# Patient Record
Sex: FEMALE | Race: WHITE | Employment: FULL TIME | URBAN - METROPOLITAN AREA
[De-identification: names, ages, dates, MRNs, and addresses within clinical notes are randomized per-mention and may not be internally consistent; named-entity substitution may affect disease eponyms.]

---

## 2017-01-25 ENCOUNTER — OFFICE VISIT (OUTPATIENT)
Dept: ORTHOPEDIC SURGERY | Age: 72
End: 2017-01-25

## 2017-01-25 VITALS
BODY MASS INDEX: 33.29 KG/M2 | HEIGHT: 67 IN | SYSTOLIC BLOOD PRESSURE: 134 MMHG | HEART RATE: 74 BPM | DIASTOLIC BLOOD PRESSURE: 81 MMHG | WEIGHT: 212.08 LBS

## 2017-01-25 DIAGNOSIS — M54.50 PAIN OF LUMBAR SPINE: ICD-10-CM

## 2017-01-25 DIAGNOSIS — M25.551 PAIN OF BOTH HIP JOINTS: ICD-10-CM

## 2017-01-25 DIAGNOSIS — M25.552 PAIN OF BOTH HIP JOINTS: ICD-10-CM

## 2017-01-25 DIAGNOSIS — M47.816 SPONDYLOSIS OF LUMBAR REGION WITHOUT MYELOPATHY OR RADICULOPATHY: Primary | ICD-10-CM

## 2017-01-25 PROCEDURE — 3017F COLORECTAL CA SCREEN DOC REV: CPT | Performed by: PHYSICAL MEDICINE & REHABILITATION

## 2017-01-25 PROCEDURE — 1090F PRES/ABSN URINE INCON ASSESS: CPT | Performed by: PHYSICAL MEDICINE & REHABILITATION

## 2017-01-25 PROCEDURE — G8427 DOCREV CUR MEDS BY ELIG CLIN: HCPCS | Performed by: PHYSICAL MEDICINE & REHABILITATION

## 2017-01-25 PROCEDURE — G8419 CALC BMI OUT NRM PARAM NOF/U: HCPCS | Performed by: PHYSICAL MEDICINE & REHABILITATION

## 2017-01-25 PROCEDURE — G8484 FLU IMMUNIZE NO ADMIN: HCPCS | Performed by: PHYSICAL MEDICINE & REHABILITATION

## 2017-01-25 PROCEDURE — 72100 X-RAY EXAM L-S SPINE 2/3 VWS: CPT | Performed by: PHYSICAL MEDICINE & REHABILITATION

## 2017-01-25 PROCEDURE — 4040F PNEUMOC VAC/ADMIN/RCVD: CPT | Performed by: PHYSICAL MEDICINE & REHABILITATION

## 2017-01-25 PROCEDURE — 99203 OFFICE O/P NEW LOW 30 MIN: CPT | Performed by: PHYSICAL MEDICINE & REHABILITATION

## 2017-01-25 PROCEDURE — 3014F SCREEN MAMMO DOC REV: CPT | Performed by: PHYSICAL MEDICINE & REHABILITATION

## 2017-01-25 PROCEDURE — 1036F TOBACCO NON-USER: CPT | Performed by: PHYSICAL MEDICINE & REHABILITATION

## 2017-02-07 ENCOUNTER — HOSPITAL ENCOUNTER (OUTPATIENT)
Dept: PHYSICAL THERAPY | Age: 72
Discharge: HOME | End: 2017-02-28
Admitting: PHYSICAL MEDICINE & REHABILITATION

## 2017-02-14 ENCOUNTER — HOSPITAL ENCOUNTER (OUTPATIENT)
Dept: PHYSICAL THERAPY | Age: 72
Discharge: HOME | End: 2017-02-14
Admitting: PHYSICAL MEDICINE & REHABILITATION

## 2017-02-28 ENCOUNTER — HOSPITAL ENCOUNTER (OUTPATIENT)
Dept: PHYSICAL THERAPY | Age: 72
Discharge: HOME | End: 2017-02-28
Admitting: PHYSICAL MEDICINE & REHABILITATION

## 2017-03-02 ENCOUNTER — HOSPITAL ENCOUNTER (OUTPATIENT)
Dept: PHYSICAL THERAPY | Age: 72
Discharge: HOME | End: 2017-03-02
Admitting: PHYSICAL MEDICINE & REHABILITATION

## 2017-03-14 ENCOUNTER — HOSPITAL ENCOUNTER (OUTPATIENT)
Dept: PHYSICAL THERAPY | Age: 72
Discharge: HOME | End: 2017-03-14
Admitting: PHYSICAL MEDICINE & REHABILITATION

## 2017-03-23 ENCOUNTER — HOSPITAL ENCOUNTER (OUTPATIENT)
Dept: PHYSICAL THERAPY | Age: 72
Discharge: HOME | End: 2017-03-23
Admitting: PHYSICAL MEDICINE & REHABILITATION

## 2017-05-04 PROBLEM — M75.80 ROTATOR CUFF TENDINITIS: Status: ACTIVE | Noted: 2017-05-04

## 2017-05-04 PROBLEM — M75.81 TENDINITIS OF RIGHT ROTATOR CUFF: Status: ACTIVE | Noted: 2017-05-04

## 2017-05-04 PROBLEM — M25.511 CHRONIC RIGHT SHOULDER PAIN: Status: ACTIVE | Noted: 2017-05-04

## 2017-05-04 PROBLEM — M75.21 BICEPS TENDINITIS ON RIGHT: Status: ACTIVE | Noted: 2017-05-04

## 2017-05-04 PROBLEM — G89.29 CHRONIC RIGHT SHOULDER PAIN: Status: ACTIVE | Noted: 2017-05-04

## 2017-05-04 PROBLEM — M75.101 TEAR OF RIGHT ROTATOR CUFF: Status: ACTIVE | Noted: 2017-05-04

## 2017-05-04 PROBLEM — M75.50 BURSITIS OF SHOULDER: Status: ACTIVE | Noted: 2017-05-04

## 2017-05-04 PROBLEM — M75.51 BURSITIS OF RIGHT SHOULDER: Status: ACTIVE | Noted: 2017-05-04

## 2017-05-04 PROBLEM — M47.812 NECK ARTHRITIS: Status: ACTIVE | Noted: 2017-05-04

## 2017-05-04 PROBLEM — M54.2 NECK PAIN: Status: ACTIVE | Noted: 2017-05-04

## 2017-05-04 PROBLEM — Z98.890 HISTORY OF SHOULDER SURGERY: Status: ACTIVE | Noted: 2017-05-04

## 2017-05-16 ENCOUNTER — HOSPITAL ENCOUNTER (OUTPATIENT)
Dept: MRI IMAGING | Age: 72
Discharge: HOME | End: 2017-05-16
Attending: ORTHOPAEDIC SURGERY | Admitting: ORTHOPAEDIC SURGERY

## 2017-05-16 DIAGNOSIS — M25.511 RIGHT SHOULDER PAIN, UNSPECIFIED CHRONICITY: ICD-10-CM

## 2017-05-16 DIAGNOSIS — M75.101 ROTATOR CUFF SYNDROME OF RIGHT SHOULDER: ICD-10-CM

## 2017-05-16 DIAGNOSIS — M77.8 TENDINITIS OF RIGHT SHOULDER: ICD-10-CM

## 2017-05-16 DIAGNOSIS — M25.511 CHRONIC RIGHT SHOULDER PAIN: ICD-10-CM

## 2017-05-16 DIAGNOSIS — M75.22 BICEPS TENDINITIS OF BOTH SHOULDERS: ICD-10-CM

## 2017-05-16 DIAGNOSIS — M75.101 TEAR OF RIGHT ROTATOR CUFF, UNSPECIFIED TEAR EXTENT: ICD-10-CM

## 2017-05-16 DIAGNOSIS — M75.51 BURSITIS OF RIGHT SHOULDER: ICD-10-CM

## 2017-05-16 DIAGNOSIS — M75.51 ACUTE BURSITIS OF RIGHT SHOULDER: ICD-10-CM

## 2017-05-16 DIAGNOSIS — M75.81 TENDINITIS OF RIGHT ROTATOR CUFF: ICD-10-CM

## 2017-05-16 DIAGNOSIS — G89.29 CHRONIC RIGHT SHOULDER PAIN: ICD-10-CM

## 2017-05-16 DIAGNOSIS — M75.101 RIGHT ROTATOR CUFF TEAR: ICD-10-CM

## 2017-05-16 DIAGNOSIS — M75.21 BICEPS TENDINITIS ON RIGHT: ICD-10-CM

## 2017-05-16 DIAGNOSIS — Z98.890 HISTORY OF SHOULDER SURGERY: ICD-10-CM

## 2017-05-16 DIAGNOSIS — M75.21 BICEPS TENDINITIS OF BOTH SHOULDERS: ICD-10-CM

## 2017-05-16 DIAGNOSIS — G89.29 OTHER CHRONIC PAIN: ICD-10-CM

## 2017-05-16 DIAGNOSIS — M75.21 BICEPS TENDINITIS OF RIGHT SHOULDER: ICD-10-CM

## 2017-05-16 RX ORDER — LIDOCAINE HYDROCHLORIDE 10 MG/ML
5 INJECTION, SOLUTION INFILTRATION; PERINEURAL ONCE
Status: COMPLETED | OUTPATIENT
Start: 2017-05-16 | End: 2017-05-16

## 2017-05-16 RX ORDER — LIDOCAINE HYDROCHLORIDE 10 MG/ML
5 INJECTION, SOLUTION EPIDURAL; INFILTRATION; INTRACAUDAL; PERINEURAL ONCE
Status: COMPLETED | OUTPATIENT
Start: 2017-05-16 | End: 2017-05-16

## 2017-05-16 RX ADMIN — LIDOCAINE HYDROCHLORIDE 5 ML: 10 INJECTION, SOLUTION INFILTRATION; PERINEURAL at 09:14

## 2017-05-16 RX ADMIN — LIDOCAINE HYDROCHLORIDE 5 ML: 10 INJECTION, SOLUTION EPIDURAL; INFILTRATION; INTRACAUDAL; PERINEURAL at 09:13

## 2017-05-16 ASSESSMENT — PAIN SCALES - GENERAL: PAINLEVEL_OUTOF10: 6

## 2017-05-22 ENCOUNTER — OFFICE VISIT (OUTPATIENT)
Dept: ORTHOPEDIC SURGERY | Age: 72
End: 2017-05-22

## 2017-05-22 VITALS
DIASTOLIC BLOOD PRESSURE: 79 MMHG | SYSTOLIC BLOOD PRESSURE: 132 MMHG | HEIGHT: 68 IN | BODY MASS INDEX: 33.34 KG/M2 | WEIGHT: 220 LBS

## 2017-05-22 DIAGNOSIS — M50.30 DEGENERATIVE CERVICAL DISC: ICD-10-CM

## 2017-05-22 DIAGNOSIS — G89.29 CHRONIC SCAPULAR PAIN: ICD-10-CM

## 2017-05-22 DIAGNOSIS — M47.812 SPONDYLOSIS OF CERVICAL REGION WITHOUT MYELOPATHY OR RADICULOPATHY: Primary | ICD-10-CM

## 2017-05-22 DIAGNOSIS — M89.8X1 CHRONIC SCAPULAR PAIN: ICD-10-CM

## 2017-05-22 PROCEDURE — G8427 DOCREV CUR MEDS BY ELIG CLIN: HCPCS | Performed by: PHYSICAL MEDICINE & REHABILITATION

## 2017-05-22 PROCEDURE — G8417 CALC BMI ABV UP PARAM F/U: HCPCS | Performed by: PHYSICAL MEDICINE & REHABILITATION

## 2017-05-22 PROCEDURE — 3017F COLORECTAL CA SCREEN DOC REV: CPT | Performed by: PHYSICAL MEDICINE & REHABILITATION

## 2017-05-22 PROCEDURE — 1036F TOBACCO NON-USER: CPT | Performed by: PHYSICAL MEDICINE & REHABILITATION

## 2017-05-22 PROCEDURE — 3014F SCREEN MAMMO DOC REV: CPT | Performed by: PHYSICAL MEDICINE & REHABILITATION

## 2017-05-22 PROCEDURE — 1123F ACP DISCUSS/DSCN MKR DOCD: CPT | Performed by: PHYSICAL MEDICINE & REHABILITATION

## 2017-05-22 PROCEDURE — 4040F PNEUMOC VAC/ADMIN/RCVD: CPT | Performed by: PHYSICAL MEDICINE & REHABILITATION

## 2017-05-22 PROCEDURE — 1090F PRES/ABSN URINE INCON ASSESS: CPT | Performed by: PHYSICAL MEDICINE & REHABILITATION

## 2017-05-22 PROCEDURE — G8400 PT W/DXA NO RESULTS DOC: HCPCS | Performed by: PHYSICAL MEDICINE & REHABILITATION

## 2017-05-22 PROCEDURE — 99214 OFFICE O/P EST MOD 30 MIN: CPT | Performed by: PHYSICAL MEDICINE & REHABILITATION

## 2017-05-25 ENCOUNTER — TELEPHONE (OUTPATIENT)
Dept: ORTHOPEDIC SURGERY | Age: 72
End: 2017-05-25

## 2017-05-25 ENCOUNTER — OFFICE VISIT (OUTPATIENT)
Dept: ORTHOPEDIC SURGERY | Age: 72
End: 2017-05-25

## 2017-05-25 VITALS
WEIGHT: 220 LBS | SYSTOLIC BLOOD PRESSURE: 154 MMHG | HEART RATE: 79 BPM | DIASTOLIC BLOOD PRESSURE: 92 MMHG | HEIGHT: 68 IN | BODY MASS INDEX: 33.34 KG/M2

## 2017-05-25 DIAGNOSIS — M75.81 TENDINITIS OF RIGHT ROTATOR CUFF: ICD-10-CM

## 2017-05-25 DIAGNOSIS — M22.41 CHONDROMALACIA PATELLAE OF RIGHT KNEE: ICD-10-CM

## 2017-05-25 DIAGNOSIS — M47.812 NECK ARTHRITIS: ICD-10-CM

## 2017-05-25 DIAGNOSIS — Z98.890 S/P RIGHT KNEE ARTHROSCOPY: ICD-10-CM

## 2017-05-25 DIAGNOSIS — Z98.890 HISTORY OF SHOULDER SURGERY: ICD-10-CM

## 2017-05-25 DIAGNOSIS — G89.29 CHRONIC PAIN OF LEFT KNEE: ICD-10-CM

## 2017-05-25 DIAGNOSIS — M17.11 PRIMARY OSTEOARTHRITIS OF RIGHT KNEE: ICD-10-CM

## 2017-05-25 DIAGNOSIS — G89.29 CHRONIC PAIN OF RIGHT KNEE: ICD-10-CM

## 2017-05-25 DIAGNOSIS — M22.42 CHONDROMALACIA PATELLAE OF LEFT KNEE: ICD-10-CM

## 2017-05-25 DIAGNOSIS — M17.12 PRIMARY OSTEOARTHRITIS OF LEFT KNEE: ICD-10-CM

## 2017-05-25 DIAGNOSIS — Z98.890 S/P LEFT KNEE ARTHROSCOPY: ICD-10-CM

## 2017-05-25 DIAGNOSIS — M54.2 NECK PAIN: ICD-10-CM

## 2017-05-25 DIAGNOSIS — M75.121 COMPLETE TEAR OF RIGHT ROTATOR CUFF: ICD-10-CM

## 2017-05-25 DIAGNOSIS — M25.511 CHRONIC RIGHT SHOULDER PAIN: Primary | ICD-10-CM

## 2017-05-25 DIAGNOSIS — M75.51 BURSITIS OF RIGHT SHOULDER: ICD-10-CM

## 2017-05-25 DIAGNOSIS — G89.29 CHRONIC RIGHT SHOULDER PAIN: Primary | ICD-10-CM

## 2017-05-25 DIAGNOSIS — M25.561 CHRONIC PAIN OF RIGHT KNEE: ICD-10-CM

## 2017-05-25 DIAGNOSIS — M25.562 CHRONIC PAIN OF LEFT KNEE: ICD-10-CM

## 2017-05-25 DIAGNOSIS — M75.21 BICEPS TENDINITIS ON RIGHT: ICD-10-CM

## 2017-05-25 PROCEDURE — 1036F TOBACCO NON-USER: CPT | Performed by: ORTHOPAEDIC SURGERY

## 2017-05-25 PROCEDURE — 20610 DRAIN/INJ JOINT/BURSA W/O US: CPT | Performed by: ORTHOPAEDIC SURGERY

## 2017-05-25 PROCEDURE — 99214 OFFICE O/P EST MOD 30 MIN: CPT | Performed by: ORTHOPAEDIC SURGERY

## 2017-05-25 PROCEDURE — G8400 PT W/DXA NO RESULTS DOC: HCPCS | Performed by: ORTHOPAEDIC SURGERY

## 2017-05-25 PROCEDURE — G8427 DOCREV CUR MEDS BY ELIG CLIN: HCPCS | Performed by: ORTHOPAEDIC SURGERY

## 2017-05-25 PROCEDURE — 1123F ACP DISCUSS/DSCN MKR DOCD: CPT | Performed by: ORTHOPAEDIC SURGERY

## 2017-05-25 PROCEDURE — G8417 CALC BMI ABV UP PARAM F/U: HCPCS | Performed by: ORTHOPAEDIC SURGERY

## 2017-05-25 PROCEDURE — 3017F COLORECTAL CA SCREEN DOC REV: CPT | Performed by: ORTHOPAEDIC SURGERY

## 2017-05-25 PROCEDURE — 1090F PRES/ABSN URINE INCON ASSESS: CPT | Performed by: ORTHOPAEDIC SURGERY

## 2017-05-25 PROCEDURE — 3014F SCREEN MAMMO DOC REV: CPT | Performed by: ORTHOPAEDIC SURGERY

## 2017-05-25 PROCEDURE — 4040F PNEUMOC VAC/ADMIN/RCVD: CPT | Performed by: ORTHOPAEDIC SURGERY

## 2017-06-20 ENCOUNTER — HOSPITAL ENCOUNTER (OUTPATIENT)
Dept: MRI IMAGING | Age: 72
Discharge: HOME | End: 2017-06-20
Admitting: PHYSICAL MEDICINE & REHABILITATION

## 2017-06-20 DIAGNOSIS — M89.8X1 CHRONIC SCAPULAR PAIN: ICD-10-CM

## 2017-06-20 DIAGNOSIS — M47.812 SPONDYLOSIS OF CERVICAL REGION WITHOUT MYELOPATHY OR RADICULOPATHY: ICD-10-CM

## 2017-06-20 DIAGNOSIS — M50.30 DEGENERATIVE CERVICAL DISC: ICD-10-CM

## 2017-06-20 DIAGNOSIS — G89.29 CHRONIC SCAPULAR PAIN: ICD-10-CM

## 2017-06-23 ENCOUNTER — TELEPHONE (OUTPATIENT)
Dept: ORTHOPEDIC SURGERY | Age: 72
End: 2017-06-23

## 2017-06-23 ENCOUNTER — OFFICE VISIT (OUTPATIENT)
Dept: ORTHOPEDIC SURGERY | Age: 72
End: 2017-06-23

## 2017-06-23 VITALS
WEIGHT: 220 LBS | HEIGHT: 68 IN | BODY MASS INDEX: 33.34 KG/M2 | DIASTOLIC BLOOD PRESSURE: 77 MMHG | SYSTOLIC BLOOD PRESSURE: 126 MMHG

## 2017-06-23 DIAGNOSIS — M47.812 SPONDYLOSIS OF CERVICAL REGION WITHOUT MYELOPATHY OR RADICULOPATHY: ICD-10-CM

## 2017-06-23 DIAGNOSIS — M48.02 CERVICAL STENOSIS OF SPINE: Primary | ICD-10-CM

## 2017-06-23 DIAGNOSIS — M50.20 HNP (HERNIATED NUCLEUS PULPOSUS), CERVICAL: ICD-10-CM

## 2017-06-23 PROCEDURE — 4040F PNEUMOC VAC/ADMIN/RCVD: CPT | Performed by: PHYSICAL MEDICINE & REHABILITATION

## 2017-06-23 PROCEDURE — G8400 PT W/DXA NO RESULTS DOC: HCPCS | Performed by: PHYSICAL MEDICINE & REHABILITATION

## 2017-06-23 PROCEDURE — 1123F ACP DISCUSS/DSCN MKR DOCD: CPT | Performed by: PHYSICAL MEDICINE & REHABILITATION

## 2017-06-23 PROCEDURE — 1090F PRES/ABSN URINE INCON ASSESS: CPT | Performed by: PHYSICAL MEDICINE & REHABILITATION

## 2017-06-23 PROCEDURE — G8427 DOCREV CUR MEDS BY ELIG CLIN: HCPCS | Performed by: PHYSICAL MEDICINE & REHABILITATION

## 2017-06-23 PROCEDURE — 99212 OFFICE O/P EST SF 10 MIN: CPT | Performed by: PHYSICAL MEDICINE & REHABILITATION

## 2017-06-23 PROCEDURE — 3017F COLORECTAL CA SCREEN DOC REV: CPT | Performed by: PHYSICAL MEDICINE & REHABILITATION

## 2017-06-23 PROCEDURE — 1036F TOBACCO NON-USER: CPT | Performed by: PHYSICAL MEDICINE & REHABILITATION

## 2017-06-23 PROCEDURE — G8417 CALC BMI ABV UP PARAM F/U: HCPCS | Performed by: PHYSICAL MEDICINE & REHABILITATION

## 2017-06-23 PROCEDURE — 3014F SCREEN MAMMO DOC REV: CPT | Performed by: PHYSICAL MEDICINE & REHABILITATION

## 2017-07-20 ENCOUNTER — HOSPITAL ENCOUNTER (OUTPATIENT)
Dept: PREADMISSION TESTING | Age: 72
Discharge: HOME | End: 2017-07-20
Admitting: ORTHOPAEDIC SURGERY

## 2017-07-20 VITALS — BODY MASS INDEX: 33.34 KG/M2 | HEIGHT: 68 IN | WEIGHT: 220 LBS

## 2017-07-20 RX ORDER — CHLORHEXIDINE GLUCONATE 0.12 MG/ML
15 RINSE ORAL 2 TIMES DAILY
Status: CANCELLED | OUTPATIENT
Start: 2017-07-23

## 2017-07-24 ENCOUNTER — HOSPITAL ENCOUNTER (OUTPATIENT)
Dept: SURGERY | Age: 72
Discharge: HOME | End: 2017-07-24
Admitting: ORTHOPAEDIC SURGERY

## 2017-07-24 VITALS
SYSTOLIC BLOOD PRESSURE: 144 MMHG | TEMPERATURE: 98 F | BODY MASS INDEX: 34.53 KG/M2 | OXYGEN SATURATION: 95 % | WEIGHT: 220 LBS | HEIGHT: 67 IN | HEART RATE: 56 BPM | DIASTOLIC BLOOD PRESSURE: 81 MMHG | RESPIRATION RATE: 16 BRPM

## 2017-07-24 DIAGNOSIS — G89.18 POST-OP PAIN: Primary | ICD-10-CM

## 2017-07-24 RX ORDER — MORPHINE SULFATE 2 MG/ML
2 INJECTION, SOLUTION INTRAMUSCULAR; INTRAVENOUS EVERY 5 MIN PRN
Status: DISCONTINUED | OUTPATIENT
Start: 2017-07-24 | End: 2017-07-25 | Stop reason: HOSPADM

## 2017-07-24 RX ORDER — LIDOCAINE HYDROCHLORIDE 10 MG/ML
1 INJECTION, SOLUTION EPIDURAL; INFILTRATION; INTRACAUDAL; PERINEURAL
Status: ACTIVE | OUTPATIENT
Start: 2017-07-24 | End: 2017-07-24

## 2017-07-24 RX ORDER — SODIUM CHLORIDE 0.9 % (FLUSH) 0.9 %
10 SYRINGE (ML) INJECTION EVERY 12 HOURS SCHEDULED
Status: DISCONTINUED | OUTPATIENT
Start: 2017-07-24 | End: 2017-07-25 | Stop reason: HOSPADM

## 2017-07-24 RX ORDER — HYDRALAZINE HYDROCHLORIDE 20 MG/ML
5 INJECTION INTRAMUSCULAR; INTRAVENOUS EVERY 5 MIN PRN
Status: DISCONTINUED | OUTPATIENT
Start: 2017-07-24 | End: 2017-07-25 | Stop reason: HOSPADM

## 2017-07-24 RX ORDER — SODIUM CHLORIDE 0.9 % (FLUSH) 0.9 %
10 SYRINGE (ML) INJECTION PRN
Status: DISCONTINUED | OUTPATIENT
Start: 2017-07-24 | End: 2017-07-25 | Stop reason: HOSPADM

## 2017-07-24 RX ORDER — ONDANSETRON 2 MG/ML
4 INJECTION INTRAMUSCULAR; INTRAVENOUS ONCE
Status: DISCONTINUED | OUTPATIENT
Start: 2017-07-24 | End: 2017-07-25 | Stop reason: HOSPADM

## 2017-07-24 RX ORDER — BUPIVACAINE HYDROCHLORIDE AND EPINEPHRINE 2.5; 5 MG/ML; UG/ML
INJECTION, SOLUTION EPIDURAL; INFILTRATION; INTRACAUDAL; PERINEURAL
Status: DISCONTINUED
Start: 2017-07-24 | End: 2017-07-24 | Stop reason: WASHOUT

## 2017-07-24 RX ORDER — FENTANYL CITRATE 50 UG/ML
25 INJECTION, SOLUTION INTRAMUSCULAR; INTRAVENOUS EVERY 5 MIN PRN
Status: DISCONTINUED | OUTPATIENT
Start: 2017-07-24 | End: 2017-07-25 | Stop reason: HOSPADM

## 2017-07-24 RX ORDER — SODIUM CHLORIDE, SODIUM LACTATE, POTASSIUM CHLORIDE, CALCIUM CHLORIDE 600; 310; 30; 20 MG/100ML; MG/100ML; MG/100ML; MG/100ML
INJECTION, SOLUTION INTRAVENOUS CONTINUOUS
Status: DISCONTINUED | OUTPATIENT
Start: 2017-07-24 | End: 2017-07-25 | Stop reason: HOSPADM

## 2017-07-24 RX ORDER — LABETALOL HYDROCHLORIDE 5 MG/ML
5 INJECTION, SOLUTION INTRAVENOUS EVERY 10 MIN PRN
Status: DISCONTINUED | OUTPATIENT
Start: 2017-07-24 | End: 2017-07-25 | Stop reason: HOSPADM

## 2017-07-24 RX ORDER — CHLORHEXIDINE GLUCONATE 0.12 MG/ML
15 RINSE ORAL 2 TIMES DAILY
Status: DISCONTINUED | OUTPATIENT
Start: 2017-07-24 | End: 2017-07-25 | Stop reason: HOSPADM

## 2017-07-24 RX ORDER — ENALAPRILAT 2.5 MG/2ML
1.25 INJECTION INTRAVENOUS
Status: ACTIVE | OUTPATIENT
Start: 2017-07-24 | End: 2017-07-24

## 2017-07-24 RX ORDER — HYDROCODONE BITARTRATE AND ACETAMINOPHEN 5; 325 MG/1; MG/1
1 TABLET ORAL EVERY 6 HOURS PRN
Qty: 30 TABLET | Refills: 0 | Status: SHIPPED | OUTPATIENT
Start: 2017-07-24 | End: 2017-08-03

## 2017-07-24 RX ORDER — MIDAZOLAM HYDROCHLORIDE 1 MG/ML
2 INJECTION INTRAMUSCULAR; INTRAVENOUS
Status: DISPENSED | OUTPATIENT
Start: 2017-07-24 | End: 2017-07-24

## 2017-07-24 RX ORDER — FENTANYL CITRATE 50 UG/ML
100 INJECTION, SOLUTION INTRAMUSCULAR; INTRAVENOUS ONCE
Status: COMPLETED | OUTPATIENT
Start: 2017-07-24 | End: 2017-07-24

## 2017-07-24 RX ORDER — PROMETHAZINE HYDROCHLORIDE 25 MG/1
25 TABLET ORAL EVERY 8 HOURS PRN
Qty: 30 TABLET | Refills: 0 | Status: SHIPPED | OUTPATIENT
Start: 2017-07-24 | End: 2017-07-31

## 2017-07-24 RX ORDER — DEXAMETHASONE SODIUM PHOSPHATE 4 MG/ML
4 INJECTION, SOLUTION INTRA-ARTICULAR; INTRALESIONAL; INTRAMUSCULAR; INTRAVENOUS; SOFT TISSUE ONCE
Status: COMPLETED | OUTPATIENT
Start: 2017-07-24 | End: 2017-07-24

## 2017-07-24 RX ORDER — ONDANSETRON 2 MG/ML
4 INJECTION INTRAMUSCULAR; INTRAVENOUS
Status: COMPLETED | OUTPATIENT
Start: 2017-07-24 | End: 2017-07-24

## 2017-07-24 RX ORDER — GLYCOPYRROLATE 0.2 MG/ML
0.2 INJECTION INTRAMUSCULAR; INTRAVENOUS ONCE
Status: COMPLETED | OUTPATIENT
Start: 2017-07-24 | End: 2017-07-24

## 2017-07-24 RX ORDER — BUPIVACAINE HYDROCHLORIDE 5 MG/ML
INJECTION, SOLUTION EPIDURAL; INTRACAUDAL
Status: DISPENSED
Start: 2017-07-24 | End: 2017-07-24

## 2017-07-24 RX ADMIN — GLYCOPYRROLATE 0.2 MG: 0.2 INJECTION INTRAMUSCULAR; INTRAVENOUS at 07:10

## 2017-07-24 RX ADMIN — DEXAMETHASONE SODIUM PHOSPHATE 4 MG: 4 INJECTION, SOLUTION INTRA-ARTICULAR; INTRALESIONAL; INTRAMUSCULAR; INTRAVENOUS; SOFT TISSUE at 06:41

## 2017-07-24 RX ADMIN — ONDANSETRON 4 MG: 2 INJECTION INTRAMUSCULAR; INTRAVENOUS at 07:09

## 2017-07-24 RX ADMIN — SODIUM CHLORIDE, SODIUM LACTATE, POTASSIUM CHLORIDE, CALCIUM CHLORIDE: 600; 310; 30; 20 INJECTION, SOLUTION INTRAVENOUS at 06:08

## 2017-07-24 RX ADMIN — FENTANYL CITRATE 50 MCG: 50 INJECTION, SOLUTION INTRAMUSCULAR; INTRAVENOUS at 07:10

## 2017-07-24 ASSESSMENT — PAIN SCALES - GENERAL
PAINLEVEL_OUTOF10: 0

## 2017-07-24 ASSESSMENT — PAIN DESCRIPTION - PAIN TYPE: TYPE: SURGICAL PAIN

## 2017-07-24 ASSESSMENT — PAIN - FUNCTIONAL ASSESSMENT: PAIN_FUNCTIONAL_ASSESSMENT: 0-10

## 2017-07-24 ASSESSMENT — PAIN DESCRIPTION - LOCATION: LOCATION: SHOULDER

## 2017-07-24 ASSESSMENT — PAIN DESCRIPTION - ORIENTATION: ORIENTATION: RIGHT

## 2017-08-01 DIAGNOSIS — Z98.890 S/P ROTATOR CUFF REPAIR: ICD-10-CM

## 2017-08-03 PROBLEM — M75.21 BICEPS TENDINITIS ON RIGHT: Status: RESOLVED | Noted: 2017-05-04 | Resolved: 2017-08-03

## 2017-08-03 PROBLEM — M75.51 BURSITIS OF RIGHT SHOULDER: Status: RESOLVED | Noted: 2017-05-04 | Resolved: 2017-08-03

## 2017-08-03 PROBLEM — M75.101 TEAR OF RIGHT ROTATOR CUFF: Status: RESOLVED | Noted: 2017-05-04 | Resolved: 2017-08-03

## 2017-08-03 PROBLEM — M75.81 TENDINITIS OF RIGHT ROTATOR CUFF: Status: RESOLVED | Noted: 2017-05-04 | Resolved: 2017-08-03

## 2017-08-15 ENCOUNTER — HOSPITAL ENCOUNTER (OUTPATIENT)
Dept: SURGERY | Age: 72
Discharge: HOME | End: 2017-06-20

## 2017-08-22 ENCOUNTER — HOSPITAL ENCOUNTER (OUTPATIENT)
Dept: PHYSICAL THERAPY | Age: 72
Discharge: HOME | End: 2017-08-31
Admitting: ORTHOPAEDIC SURGERY

## 2017-08-24 ENCOUNTER — HOSPITAL ENCOUNTER (OUTPATIENT)
Dept: PHYSICAL THERAPY | Age: 72
Discharge: HOME | End: 2017-08-24
Admitting: ORTHOPAEDIC SURGERY

## 2017-08-30 ENCOUNTER — HOSPITAL ENCOUNTER (OUTPATIENT)
Dept: PHYSICAL THERAPY | Age: 72
Discharge: HOME | End: 2017-08-30
Admitting: ORTHOPAEDIC SURGERY

## 2017-09-05 ENCOUNTER — HOSPITAL ENCOUNTER (OUTPATIENT)
Dept: PHYSICAL THERAPY | Age: 72
Discharge: HOME | End: 2017-09-05
Admitting: ORTHOPAEDIC SURGERY

## 2017-09-12 ENCOUNTER — HOSPITAL ENCOUNTER (OUTPATIENT)
Dept: PHYSICAL THERAPY | Age: 72
Discharge: HOME | End: 2017-09-12
Admitting: ORTHOPAEDIC SURGERY

## 2017-09-15 ENCOUNTER — HOSPITAL ENCOUNTER (OUTPATIENT)
Dept: PHYSICAL THERAPY | Age: 72
Discharge: HOME | End: 2017-09-15
Admitting: ORTHOPAEDIC SURGERY

## 2017-09-26 ENCOUNTER — HOSPITAL ENCOUNTER (OUTPATIENT)
Dept: PHYSICAL THERAPY | Age: 72
Discharge: HOME | End: 2017-09-26
Admitting: ORTHOPAEDIC SURGERY

## 2017-09-29 ENCOUNTER — HOSPITAL ENCOUNTER (OUTPATIENT)
Dept: PHYSICAL THERAPY | Age: 72
Discharge: HOME | End: 2017-09-29
Admitting: ORTHOPAEDIC SURGERY

## 2017-09-29 NOTE — FLOWSHEET NOTE
[facility], [place]    Physical Therapy Daily Treatment Note  Date:  2017    Patient Name:  Olivia Colmenares    :  1945  MRN: [id number]  Medical/Treatment Diagnosis Information:  · Diagnosis: M75.121 (ICD-10-CM) - Complete tear of right rotator cuff; R rotator cuff revision, biceps tenotomy dos: 17  · Treatment Diagnosis: M25.611 R shoulder stiffness; M62.519 shoulder weakness  Insurance/Certification information:  PT Insurance Information: Medicare  Physician Information:  Referring Practitioner: Dr. Kaushik Burnette of care signed (Y/N): Y    Date of Patient follow up with Physician:     G-Code (if applicable):      Date G-Code Applied:         Progress Note: []  Yes  [x]  No  Next due by: Visit #10      Latex Allergy:  [x]NO      []YES  Preferred Language for Healthcare:   [x]English       []other:    Visit # Insurance Allowable   8 Medicare Guidelines     Pain level:  1-2/10     SUBJECTIVE:  Pt. Notes that she has a little more soreness in her shoulder and forearm this week from not wearing the sling anymore. Pt. Denies sharp pain but notes fatigue and soreness. Pt. Reports compliance with HEP.      OBJECTIVE: 9/15  Observation:   Test measurements:      PROM AROM - NT     L R L R   Shoulder Flexion    ~135       Shoulder Abduction            Shoulder External Rotation            Shoulder Internal Rotation                   RESTRICTIONS/PRECAUTIONS: R rotator cuff revision    Exercises/Interventions:   Therapeutic Exercise  Resistance / level Sets/sec Reps Notes   gripping  1 30    Table slides - flexion, abduction  10\" 10 ^   pulleys npv      ER ranger neutral 10\" 10 Start    IR towel npv             Scapular retraction blue 3 10    shrugs 2# 3 10           triceps blue 3 10 ^   biceps 2# 3 10 ^          IR band red 3 10 ^9/26   ER band red 3 10 ^9/26          SL ER npv                           Neuromuscular Re-ed / Therapeutic Activities                                                        Manual Intervention       Shld /GH Mobs       Post Cap mobs       Thoracic/Rib manipualtion       CT MT/Mobs       PROM MT Scap, flexion  rotation 10'              Patient Education:      Therapeutic Exercise and NMR EXR  [x] (21769) Provided verbal/tactile cueing for activities related to strengthening, flexibility, endurance, ROM  for improvements in scapular, scapulothoracic and UE control with self care, reaching, carrying, lifting, house/yardwork, driving/computer work.    [] (69337) Provided verbal/tactile cueing for activities related to improving balance, coordination, kinesthetic sense, posture, motor skill, proprioception  to assist with  scapular, scapulothoracic and UE control with self care, reaching, carrying, lifting, house/yardwork, driving/computer work. Therapeutic Activities:    [] (54656 or 32288) Provided verbal/tactile cueing for activities related to improving balance, coordination, kinesthetic sense, posture, motor skill, proprioception and motor activation to allow for proper function of scapular, scapulothoracic and UE control with self care, carrying, lifting, driving/computer work.      Home Exercise Program:    [x] (15736) Reviewed/Progressed HEP activities related to strengthening, flexibility, endurance, ROM of scapular, scapulothoracic and UE control with self care, reaching, carrying, lifting, house/yardwork, driving/computer work  [] (33883) Reviewed/Progressed HEP activities related to improving balance, coordination, kinesthetic sense, posture, motor skill, proprioception of scapular, scapulothoracic and UE control with self care, reaching, carrying, lifting, house/yardwork, driving/computer work      Manual Treatments:  PROM / STM / Oscillations-Mobs:  G-I, II, III, IV (PA's, Inf., Post.)  [x] (64446) Provided manual therapy to mobilize soft tissue/joints of cervical/CT, scapular GHJ and UE for the purpose of modulating pain, promoting relaxation,  increasing ROM, reducing/eliminating soft tissue swelling/inflammation/restriction, improving soft tissue extensibility and allowing for proper ROM for normal function with self care, reaching, carrying, lifting, house/yardwork, driving/computer work    Modalities:    Ice - 15'      Charges:  Timed Code Treatment Minutes: 38   Total Treatment Minutes: 58       [] EVAL - LOW (88995)   [] EVAL - MOD (42537)  [] EVAL - HIGH (15162)  [] RE-EVAL (13133)  [x] YL(90903) x  2   [] IONTO  [] NMR (31208) x      [] VASO  [x] Manual (20508) x  1    [] Other: group  [] TA x       [] Mech Traction (18395)  [] ES(attended) (29356)      [] ES (un) (74732):     GOALS:  Patient stated goal: regain full range of motion in R shoulder     Therapist goals for Patient:   Short Term Goals: To be achieved in: 2 weeks  1. Independent in HEP and progression per patient tolerance, in order to prevent re-injury. MET   2. Patient will have a decrease in pain to facilitate improvement in movement, function, and ADLs as indicated by Functional Deficits. MET  3. Patient demonstrates understanding of and compliance with precautions following surgery. MET        Long Term Goals: To be achieved in: 4 months  1. Disability index score of 20% or less for the UEFI to assist with reaching prior level of function. 2. Patient will demonstrate increased AROM to 150 shoulder elevation to allow for proper joint functioning as indicated by patients Functional Deficits. 3. Patient will demonstrate an increase in Strength to tolerate submax strength testing and at least 4/5 to allow for proper functional mobility as indicated by patients Functional Deficits. 4. Patient will return to functional activities including dressing, bathing and sleeping without increased symptoms or restriction. 5. Patient will return to work and [facility] [employer] without increased symptoms or restriction. Progression Towards Functional goals:  [x] Patient is progressing as expected towards functional goals listed. [] Progression is slowed due to complexities listed. [] Progression has been slowed due to co-morbidities. [] Plan just implemented, too soon to assess goals progression  [] Other:     Persisting Functional Limitations/Impairments:  []Sitting []Standing   []Walking []Squatting/bending    []Stairs [x]ADL's    []Transfers [x]Reaching  [x]Housework [x]Job related tasks  [x]Driving [x]Sports/Recreation   []Other:    ASSESSMENT:    Treatment/Activity Tolerance:  [x] Patient tolerated treatment well [] Patient limited by fatique  [] Patient limited by pain  [] Patient limited by other medical complications  [x] Other:  Pt. Tolerated therapy today without complaints. Limited in progression due to lingering soreness in shoulder. Pt. Required cueing for technique with band resisted exercises and given updated illustrations of HEP. Pt. With deficits in shoulder motion with manual stretching today due to muscle guarding. Increased resistance with triceps band without issue. Pt. Requires continued progression of post-op protocol in order to return to full functional use of R UE.      Prognosis: [x] Good [] Fair  [] Poor    Patient Requires Follow-up: [x] Yes  [] No    Return to Play:    [x]  N/A     []  Stage 1: Intro to Strength   []  Stage 2: Dynamic Strength and Intro to Plyometrics   []  Stage 3: Advanced Plyometrics and Intro to Throwing   []  Stage 4: Sport specific Training/Return to Sport     []  Ready to Return to Play, [employer]   []  Not Ready for Return to Sports   Comments:      PLAN: 1-2x/wk  [x] Continue per plan of care [] Alter current plan (see comments)  [] Plan of care initiated [] Hold pending MD visit [] Discharge    Electronically signed by: Michael Castillo PT, DPT

## 2017-10-03 ENCOUNTER — HOSPITAL ENCOUNTER (OUTPATIENT)
Dept: PHYSICAL THERAPY | Age: 72
Discharge: HOME | End: 2017-10-03
Admitting: ORTHOPAEDIC SURGERY

## 2017-10-10 ENCOUNTER — HOSPITAL ENCOUNTER (OUTPATIENT)
Dept: PHYSICAL THERAPY | Age: 72
Discharge: HOME | End: 2017-10-10
Admitting: ORTHOPAEDIC SURGERY

## 2017-10-13 ENCOUNTER — HOSPITAL ENCOUNTER (OUTPATIENT)
Dept: PHYSICAL THERAPY | Age: 72
Discharge: HOME | End: 2017-10-13
Admitting: ORTHOPAEDIC SURGERY

## 2017-10-13 NOTE — FLOWSHEET NOTE
[facility], [place]      Physical Therapy Daily Treatment Note  Date:  10/13/2017    Patient Name:  Lawyer Ariza    :  1945  MRN: [id number]  Medical/Treatment Diagnosis Information:  · Diagnosis: M75.121 (ICD-10-CM) - Complete tear of right rotator cuff; R rotator cuff revision, biceps tenotomy dos: 17  · Treatment Diagnosis: M25.611 R shoulder stiffness; M62.519 shoulder weakness  Insurance/Certification information:  PT Insurance Information: Medicare  Physician Information:  Referring Practitioner: Dr. Liz Alves of care signed (Y/N): Y    Date of Patient follow up with Physician:     G-Code (if applicable):      Date G-Code Applied:         Progress Note: []  Yes  [x]  No  Next due by: Visit #20    Latex Allergy:  [x]NO      []YES  Preferred Language for Healthcare:   [x]English       []other:    Visit # Insurance Allowable   12 Medicare Guidelines     Pain level:  1-2/10     SUBJECTIVE: Pt. Reports that her shoulder is a little sore today but not worse than previously. Pt. States that she was fatigued following last therapy session. Pt. Reports compliance with HEP.        OBJECTIVE: 10/10  Observation:    Incisions: closed and healing  Test measurements:      PROM AROM - NT     L R L R   Shoulder Flexion    156       Shoulder Abduction    148        Shoulder External Rotation     60       Shoulder Internal Rotation     -25              RESTRICTIONS/PRECAUTIONS: R rotator cuff revision    Exercises/Interventions:   Therapeutic Exercise  Resistance / level Sets/sec Reps Notes      Table slides - flexion, abduction  10\" 10 ^8/30   pulleys scaption 10\" 10 Start 10/3   ER ranger 70 degrees 10\" 10 ^10/3   IR towel  10\" 10 Start 10/3   Wall walks  10\" 10 Start 10/10          Scapular retraction black 3 10 ^10/10   shrugs 3# 3 10 ^10/10          triceps black 3 10 ^10/3   biceps 3# 3 10 ^10/10          IR band green 3 10 ^10/3   ER band green 3 (35325) Provided manual therapy to mobilize soft tissue/joints of cervical/CT, scapular GHJ and UE for the purpose of modulating pain, promoting relaxation,  increasing ROM, reducing/eliminating soft tissue swelling/inflammation/restriction, improving soft tissue extensibility and allowing for proper ROM for normal function with self care, reaching, carrying, lifting, house/yardwork, driving/computer work    Modalities:    Ice - 15'      Charges:  Timed Code Treatment Minutes: 45   Total Treatment Minutes: 65       [] EVAL - LOW (97580)   [] EVAL - MOD (59547)  [] EVAL - HIGH (23379)  [] RE-EVAL (43511)  [x] SK(51209) x  2   [] IONTO  [] NMR (74933) x      [] VASO  [x] Manual (90552) x  1    [] Other:   [] TA x       [] Mech Traction (98913)  [] ES(attended) (46192)      [] ES (un) (23204):     GOALS:  Patient stated goal: regain full range of motion in R shoulder     Therapist goals for Patient:   Short Term Goals: To be achieved in: 2 weeks  1. Independent in HEP and progression per patient tolerance, in order to prevent re-injury. MET   2. Patient will have a decrease in pain to facilitate improvement in movement, function, and ADLs as indicated by Functional Deficits. MET  3. Patient demonstrates understanding of and compliance with precautions following surgery. MET        Long Term Goals: To be achieved in: 4 months  1. Disability index score of 20% or less for the UEFI to assist with reaching prior level of function. PROGRESSING  2. Patient will demonstrate increased AROM to 150 shoulder elevation to allow for proper joint functioning as indicated by patients Functional Deficits. PROM PROGRESSING  3. Patient will demonstrate an increase in Strength to tolerate submax strength testing and at least 4/5 to allow for proper functional mobility as indicated by patients Functional Deficits. NT  4.  Patient will return to functional activities including dressing, bathing and sleeping without increased symptoms or

## 2017-10-17 ENCOUNTER — HOSPITAL ENCOUNTER (OUTPATIENT)
Dept: PHYSICAL THERAPY | Age: 72
Discharge: HOME | End: 2017-10-17
Admitting: ORTHOPAEDIC SURGERY

## 2017-10-17 NOTE — FLOWSHEET NOTE
[facility], [place]      Physical Therapy Daily Treatment Note  Date:  10/17/2017    Patient Name:  Alyce Graft    :  1945  MRN: [id number]  Medical/Treatment Diagnosis Information:  · Diagnosis: M75.121 (ICD-10-CM) - Complete tear of right rotator cuff; R rotator cuff revision, biceps tenotomy dos: 17  · Treatment Diagnosis: M25.611 R shoulder stiffness; M62.519 shoulder weakness  Insurance/Certification information:  PT Insurance Information: Medicare  Physician Information:  Referring Practitioner: Dr. Keenan Wall of care signed (Y/N): Y    Date of Patient follow up with Physician:     G-Code (if applicable):      Date G-Code Applied:         Progress Note: []  Yes  [x]  No  Next due by: Visit #20    Latex Allergy:  [x]NO      []YES  Preferred Language for Healthcare:   [x]English       []other:    Visit # Insurance Allowable   13 Medicare Guidelines     Pain level:  2.5/10     SUBJECTIVE:  Pt. Reports that her shoulder continues to be feeling about the same. She was carrying groceries today and she noticed that her shoulder is a little more sore after that. Pt. Reports compliance with HEP.          OBJECTIVE: 10/10  Observation:    Incisions: closed and healing  Test measurements:      PROM AROM - NT     L R L R   Shoulder Flexion    156       Shoulder Abduction    148        Shoulder External Rotation     60       Shoulder Internal Rotation     -25              RESTRICTIONS/PRECAUTIONS: R rotator cuff revision    Exercises/Interventions:   Therapeutic Exercise  Resistance / level Sets/sec Reps Notes      Table slides - flexion, abduction  10\" 10 ^   pulleys scaption 10\" 10 Start 10/3   ER ranger 70 degrees 10\" 10 ^10/3   IR towel  10\" 10 Start 10/3   Wall walks  10\" 10 Start 10/10          Scapular retraction silver 3 10 ^10/17   shrugs 4# 3 10 ^10/17          triceps silver 3 10 ^10/17   biceps 4# 3 10 ^10/17          IR band green 3 dressing, bathing and sleeping without increased symptoms or restriction. PROGRESSING  5. Patient will return to work and going to the [employer] without increased symptoms or restriction. Progression Towards Functional goals:  [x] Patient is progressing as expected towards functional goals listed. [] Progression is slowed due to complexities listed. [] Progression has been slowed due to co-morbidities. [] Plan just implemented, too soon to assess goals progression  [] Other:     Persisting Functional Limitations/Impairments:  []Sitting []Standing   []Walking []Squatting/bending    []Stairs [x]ADL's    []Transfers [x]Reaching  [x]Housework [x]Job related tasks  [x]Driving [x]Sports/Recreation   []Other:    ASSESSMENT:    Treatment/Activity Tolerance:  [x] Patient tolerated treatment well [] Patient limited by fatique  [] Patient limited by pain  [] Patient limited by other medical complications  [x] Other:  Pt. Tolerated therapy today without complaints. Pt. Demonstrates improved shoulder mobility with manual stretching and decreased overall muscle guarding. Pt. Challenged by increased resistance with total arm and scapular control exercises but able to complete without increased symptoms. Initiated shoulder extension without issue. Pt. Requires tactile cueing for correct technique with SL ER. Pt. Requires continued progression of shoulder strength and mobility in order to return to PLOF with R UE and ability to reach overhead.      Prognosis: [x] Good [] Fair  [] Poor    Patient Requires Follow-up: [x] Yes  [] No    Return to Play:    [x]  N/A     []  Stage 1: Intro to Strength   []  Stage 2: Dynamic Strength and Intro to Plyometrics   []  Stage 3: Advanced Plyometrics and Intro to Throwing   []  Stage 4: Sport specific Training/Return to Sport     []  Ready to Return to Play, [employer]   []  Not Ready for Return to Sports   Comments:      PLAN: 1-2x/wk  [x] Continue per plan of care [] Alter current plan (see comments)  [] Plan of care initiated [] Hold pending MD visit [] Discharge    Electronically signed by: Eryn Cedeno PT, DPT

## 2017-10-19 ENCOUNTER — HOSPITAL ENCOUNTER (OUTPATIENT)
Dept: PHYSICAL THERAPY | Age: 72
Discharge: HOME | End: 2017-10-19
Admitting: ORTHOPAEDIC SURGERY

## 2017-10-19 NOTE — FLOWSHEET NOTE
[facility], [place]      Physical Therapy Daily Treatment Note  Date:  10/19/2017    Patient Name:  Wolfgang David    :  1945  MRN: [id number]  Medical/Treatment Diagnosis Information:  · Diagnosis: M75.121 (ICD-10-CM) - Complete tear of right rotator cuff; R rotator cuff revision, biceps tenotomy dos: 17  · Treatment Diagnosis: M25.611 R shoulder stiffness; M62.519 shoulder weakness  Insurance/Certification information:  PT Insurance Information: Medicare  Physician Information:  Referring Practitioner: Dr. Sai Machuca of care signed (Y/N): Y    Date of Patient follow up with Physician:     G-Code (if applicable):      Date G-Code Applied:         Progress Note: []  Yes  [x]  No  Next due by: Visit #20    Latex Allergy:  [x]NO      []YES  Preferred Language for Healthcare:   [x]English       []other:    Visit # Insurance Allowable   14 Medicare Guidelines     Pain level:  3/10     SUBJECTIVE:  Pt. Reports that her shoulder is a little more sore today. Pt. Reports compliance with HEP.           OBJECTIVE: 10/10  Observation:    Incisions: closed and healing  Test measurements:      PROM AROM - NT     L R L R   Shoulder Flexion    156       Shoulder Abduction    148        Shoulder External Rotation     60       Shoulder Internal Rotation     -25              RESTRICTIONS/PRECAUTIONS: R rotator cuff revision    Exercises/Interventions:   Therapeutic Exercise  Resistance / level Sets/sec Reps Notes      Table slides - flexion, abduction  10\" 10 ^   pulleys scaption 10\" 10 Start 10/3   ER ranger 90 degrees 10\" 10 ^10/19   IR towel  10\" 10 Start 10/3   Wall walks  10\" 10 Start 10/10                   SL ER 3# 3 10 ^10/19   Prone extension 3# 3 10 Start 10/17                 Neuromuscular Re-ed / Therapeutic Activities                                                        Manual Intervention       Shld /GH Mobs Gentle oscillation 2'     Post Cap improving soft tissue extensibility and allowing for proper ROM for normal function with self care, reaching, carrying, lifting, house/yardwork, driving/computer work    Modalities:    Ice - 15'      Charges:  Timed Code Treatment Minutes: 25   Total Treatment Minutes: 58       [] EVAL - LOW (86954)   [] EVAL - MOD (85568)  [] EVAL - HIGH (85588)  [] RE-EVAL (25776)  [x] TA(64279) x  1   [] IONTO  [] NMR (98011) x      [] VASO  [x] Manual (92464) x  1    [x] Other: group  [] TA x       [] Mech Traction (94254)  [] ES(attended) (13833)      [] ES (un) (43907):     GOALS:  Patient stated goal: regain full range of motion in R shoulder     Therapist goals for Patient:   Short Term Goals: To be achieved in: 2 weeks  1. Independent in HEP and progression per patient tolerance, in order to prevent re-injury. MET   2. Patient will have a decrease in pain to facilitate improvement in movement, function, and ADLs as indicated by Functional Deficits. MET  3. Patient demonstrates understanding of and compliance with precautions following surgery. MET        Long Term Goals: To be achieved in: 4 months  1. Disability index score of 20% or less for the UEFI to assist with reaching prior level of function. PROGRESSING  2. Patient will demonstrate increased AROM to 150 shoulder elevation to allow for proper joint functioning as indicated by patients Functional Deficits. PROM PROGRESSING  3. Patient will demonstrate an increase in Strength to tolerate submax strength testing and at least 4/5 to allow for proper functional mobility as indicated by patients Functional Deficits. NT  4. Patient will return to functional activities including dressing, bathing and sleeping without increased symptoms or restriction. PROGRESSING  5. Patient will return to work and going to the [employer] without increased symptoms or restriction.                     Progression Towards Functional goals:  [x] Patient is progressing as expected towards functional goals listed. [] Progression is slowed due to complexities listed. [] Progression has been slowed due to co-morbidities. [] Plan just implemented, too soon to assess goals progression  [] Other:     Persisting Functional Limitations/Impairments:  []Sitting []Standing   []Walking []Squatting/bending    []Stairs [x]ADL's    []Transfers [x]Reaching  [x]Housework [x]Job related tasks  [x]Driving [x]Sports/Recreation   []Other:    ASSESSMENT:    Treatment/Activity Tolerance:  [x] Patient tolerated treatment well [] Patient limited by fatique  [] Patient limited by pain  [] Patient limited by other medical complications  [x] Other: Pt. Tolerated therapy today without complaints. Limited in progression of activities due to time constraints of patient. Pt. Continues to require cueing for correct technique with ER and fatigued quickly with increased resistance. Pt. Demonstrated improvements in shoulder PROM with decreased pain at end ranges. Pt. Requires continued progression of shoulder strengthening in order to return to functional reaching with R shoulder.        Prognosis: [x] Good [] Fair  [] Poor    Patient Requires Follow-up: [x] Yes  [] No    Return to Play:    [x]  N/A     []  Stage 1: Intro to Strength   []  Stage 2: Dynamic Strength and Intro to Plyometrics   []  Stage 3: Advanced Plyometrics and Intro to Throwing   []  Stage 4: Sport specific Training/Return to Sport     []  Ready to Return to Play, [employer]   []  Not Ready for Return to Sports   Comments:      PLAN: 1-2x/wk  [x] Continue per plan of care [] Alter current plan (see comments)  [] Plan of care initiated [] Hold pending MD visit [] Discharge    Electronically signed by: Eryn Cedeno PT, DPT

## 2017-10-31 ENCOUNTER — HOSPITAL ENCOUNTER (OUTPATIENT)
Dept: PHYSICAL THERAPY | Age: 72
Discharge: HOME | End: 2017-10-31
Admitting: ORTHOPAEDIC SURGERY

## 2017-10-31 NOTE — FLOWSHEET NOTE
[facility]  Orthopaedics and Sports Rehabilitation, [place]      Physical Therapy Daily Treatment Note  Date:  10/31/2017    Patient Name:  Aleksandra Cosme    :  1945  MRN: [id number]  Medical/Treatment Diagnosis Information:  · Diagnosis: M75.121 (ICD-10-CM) - Complete tear of right rotator cuff; R rotator cuff revision, biceps tenotomy dos: 17  · Treatment Diagnosis: M25.611 R shoulder stiffness; M62.519 shoulder weakness  Insurance/Certification information:  PT Insurance Information: Medicare  Physician Information:  Referring Practitioner: Dr. Drew Thompson of care signed (Y/N): Y    Date of Patient follow up with Physician:     G-Code (if applicable):      Date G-Code Applied:         Progress Note: []  Yes  [x]  No  Next due by: Visit #20    Latex Allergy:  [x]NO      []YES  Preferred Language for Healthcare:   [x]English       []other:    Visit # Insurance Allowable   14 Medicare Guidelines     Pain level:  1.5/10 shoulder; 5-6/10 back     SUBJECTIVE:  Pt. Reports that she has minimal pain in her shoulder at this time. She does have back pain that started last week when she was away on a work trip and has continued to bother her. Due to back pain pt. Reports that she has been unable to complete resistance aspects of HEP and has only been doing the stretches.            OBJECTIVE: 10/10  Observation:    Incisions: closed and healing  Test measurements:      PROM AROM - NT     L R L R   Shoulder Flexion    156       Shoulder Abduction    148        Shoulder External Rotation     60       Shoulder Internal Rotation     -25              RESTRICTIONS/PRECAUTIONS: R rotator cuff revision    Exercises/Interventions:   Therapeutic Exercise  Resistance / level Sets/sec Reps Notes      Table slides - flexion, abduction  10\" 10 ^   pulleys scaption 10\" 10 Start 10/3   ER ranger 90 degrees 10\" 10 ^10/19   IR towel  10\" 10 Start 10/3   Wall walks  10\" 10 Start 10/10          shrugs 4# 3 10 functional activities including dressing, bathing and sleeping without increased symptoms or restriction. PROGRESSING  5. Patient will return to work and [facility] [employer] without increased symptoms or restriction. Progression Towards Functional goals:  [x] Patient is progressing as expected towards functional goals listed. [] Progression is slowed due to complexities listed. [] Progression has been slowed due to co-morbidities. [] Plan just implemented, too soon to assess goals progression  [] Other:     Persisting Functional Limitations/Impairments:  []Sitting []Standing   []Walking []Squatting/bending    []Stairs [x]ADL's    []Transfers [x]Reaching  [x]Housework [x]Job related tasks  [x]Driving [x]Sports/Recreation   []Other:    ASSESSMENT:    Treatment/Activity Tolerance:  [] Patient tolerated treatment well [] Patient limited by fatique  [x] Patient limited by pain  [] Patient limited by other medical complications  [x] Other: Pt. Limited in therapy today due to acute flair up of low back pain. Only performed activities that did not aggravate back. Modified band resisted IR/ER to seated position to decrease back pain. Pt. Maintaining shoulder mobility at this time. Pt. Instructed to rest back and only perform HEP as tolerated at this time. Pt. With difficulty transitioning from positions due to pain. Pt. Requires continued progression of shoulder strength and mobility as tolerated.       Prognosis: [x] Good [] Fair  [] Poor    Patient Requires Follow-up: [x] Yes  [] No    Return to Play:    [x]  N/A     []  Stage 1: Intro to Strength   []  Stage 2: Dynamic Strength and Intro to Plyometrics   []  Stage 3: Advanced Plyometrics and Intro to Throwing   []  Stage 4: Sport specific Training/Return to Sport     []  Ready to Return to Play, [employer]   []  Not Ready for Return to Sports   Comments:      PLAN: 1-2x/wk  [x] Continue per plan of care [] Alter current plan (see

## 2017-11-01 ENCOUNTER — HOSPITAL ENCOUNTER (OUTPATIENT)
Dept: PHYSICAL THERAPY | Age: 72
Discharge: HOME | End: 2017-11-30
Attending: ORTHOPAEDIC SURGERY | Admitting: ORTHOPAEDIC SURGERY

## 2017-11-02 ENCOUNTER — HOSPITAL ENCOUNTER (OUTPATIENT)
Dept: PHYSICAL THERAPY | Age: 72
Discharge: HOME | End: 2017-11-02
Admitting: ORTHOPAEDIC SURGERY

## 2017-11-02 NOTE — FLOWSHEET NOTE
[facility], [place]      Physical Therapy Daily Treatment Note  Date:  2017    Patient Name:  Aisha Greco    :  1945  MRN: [id number]  Medical/Treatment Diagnosis Information:  · Diagnosis: M75.121 (ICD-10-CM) - Complete tear of right rotator cuff; R rotator cuff revision, biceps tenotomy dos: 17  · Treatment Diagnosis: M25.611 R shoulder stiffness; M62.519 shoulder weakness  Insurance/Certification information:  PT Insurance Information: Medicare  Physician Information:  Referring Practitioner: Dr. Bernetta Najjar of care signed (Y/N): Y    Date of Patient follow up with Physician:     G-Code (if applicable):      Date G-Code Applied:         Progress Note: []  Yes  [x]  No  Next due by: Visit #20    Latex Allergy:  [x]NO      []YES  Preferred Language for Healthcare:   [x]English       []other:    Visit # Insurance Allowable   15 Medicare Guidelines     Pain level:  1.5/10 shoulder; 5-6/10 back     SUBJECTIVE: Pt. Reports that her back and shoulder are feeling about the same. Her back continues to be limiting her. Pt. Reports compliance with HEP as able.             OBJECTIVE: 10/10  Observation:    Incisions: closed and healing  Test measurements:      PROM AROM - NT     L R L R   Shoulder Flexion    156       Shoulder Abduction    148        Shoulder External Rotation     60       Shoulder Internal Rotation     -25              RESTRICTIONS/PRECAUTIONS: R rotator cuff revision    Exercises/Interventions:   Therapeutic Exercise  Resistance / level Sets/sec Reps Notes      Table slides - flexion, abduction  10\" 10 ^   pulleys scaption 10\" 10 Start 10/3   ER ranger 90 degrees 10\" 10 ^10/19   IR towel  10\" 10 Start 10/3   Wall walks  10\" 10 Start 10/10          shrugs 4# 3 10 ^10/17          triceps silver 3 10 ^10/17   biceps 4# 3 10 ^10/17          IR band blue 3 10 ^10/31   ER band blue 3 10 ^10/31                          Neuromuscular Re-ed / Therapeutic Activities                                                        Manual Intervention       Shld /GH Mobs Gentle oscillation 2'     Post Cap mobs       Thoracic/Rib manipualtion       CT MT/Mobs       PROM MT Scap, flexion  rotation 8'              Patient Education:      Therapeutic Exercise and NMR EXR  [x] (36521) Provided verbal/tactile cueing for activities related to strengthening, flexibility, endurance, ROM  for improvements in scapular, scapulothoracic and UE control with self care, reaching, carrying, lifting, house/yardwork, driving/computer work.    [] (86076) Provided verbal/tactile cueing for activities related to improving balance, coordination, kinesthetic sense, posture, motor skill, proprioception  to assist with  scapular, scapulothoracic and UE control with self care, reaching, carrying, lifting, house/yardwork, driving/computer work. Therapeutic Activities:    [] (03025 or 68196) Provided verbal/tactile cueing for activities related to improving balance, coordination, kinesthetic sense, posture, motor skill, proprioception and motor activation to allow for proper function of scapular, scapulothoracic and UE control with self care, carrying, lifting, driving/computer work.      Home Exercise Program:    [x] (41137) Reviewed/Progressed HEP activities related to strengthening, flexibility, endurance, ROM of scapular, scapulothoracic and UE control with self care, reaching, carrying, lifting, house/yardwork, driving/computer work  [] (29688) Reviewed/Progressed HEP activities related to improving balance, coordination, kinesthetic sense, posture, motor skill, proprioception of scapular, scapulothoracic and UE control with self care, reaching, carrying, lifting, house/yardwork, driving/computer work      Manual Treatments:  PROM / STM / Oscillations-Mobs:  G-I, II, III, IV (PA's, Inf., Post.)  [x] (88234) Provided manual therapy to mobilize soft tissue/joints of cervical/CT, without increased symptoms or restriction. Progression Towards Functional goals:  [x] Patient is progressing as expected towards functional goals listed. [] Progression is slowed due to complexities listed. [] Progression has been slowed due to co-morbidities. [] Plan just implemented, too soon to assess goals progression  [] Other:     Persisting Functional Limitations/Impairments:  []Sitting []Standing   []Walking []Squatting/bending    []Stairs [x]ADL's    []Transfers [x]Reaching  [x]Housework [x]Job related tasks  [x]Driving [x]Sports/Recreation   []Other:    ASSESSMENT:    Treatment/Activity Tolerance:  [] Patient tolerated treatment well [] Patient limited by fatique  [x] Patient limited by pain  [] Patient limited by other medical complications  [x] Other: Pt. Continues to be limited in therapy session by back pain. Completed all activities as able without increasing back pain. Pt. Continues to be challenged by band resisted IR/ER. Pt. Demonstrates only slight deficits at end ranges with shoulder PROM but decreased overall pain. Pt. Requires continued progression of therapy as tolerated in order to improve shoulder functional movement and strength.        Prognosis: [x] Good [] Fair  [] Poor    Patient Requires Follow-up: [x] Yes  [] No    Return to Play:    [x]  N/A     []  Stage 1: Intro to Strength   []  Stage 2: Dynamic Strength and Intro to Plyometrics   []  Stage 3: Advanced Plyometrics and Intro to Throwing   []  Stage 4: Sport specific Training/Return to Sport     []  Ready to Return to Play, [employer]   []  Not Ready for Return to Sports   Comments:      PLAN: 1-2x/wk  [x] Continue per plan of care [] Alter current plan (see comments)  [] Plan of care initiated [] Hold pending MD visit [] Discharge    Electronically signed by: Lance Gómez PT, DPT

## 2017-11-07 ENCOUNTER — HOSPITAL ENCOUNTER (OUTPATIENT)
Dept: PHYSICAL THERAPY | Age: 72
Discharge: HOME | End: 2017-11-07
Admitting: ORTHOPAEDIC SURGERY

## 2017-11-07 NOTE — FLOWSHEET NOTE
blue 3 10 ^10/31   ER band blue 3 10 ^10/31            SL ER 3# 3 10 ^10/19   Prone extension 3# 3 10 Start 10/17   Horizontal abduction npv             Neuromuscular Re-ed / Therapeutic Activities       Rhythmic stabilization npv                                                Manual Intervention       Shld /GH Mobs Gentle oscillation 2'     Post Cap mobs       Thoracic/Rib manipualtion       CT MT/Mobs       PROM MT Scap, flexion  rotation 7'              Patient Education:      Therapeutic Exercise and NMR EXR  [x] (95323) Provided verbal/tactile cueing for activities related to strengthening, flexibility, endurance, ROM  for improvements in scapular, scapulothoracic and UE control with self care, reaching, carrying, lifting, house/yardwork, driving/computer work.    [] (57623) Provided verbal/tactile cueing for activities related to improving balance, coordination, kinesthetic sense, posture, motor skill, proprioception  to assist with  scapular, scapulothoracic and UE control with self care, reaching, carrying, lifting, house/yardwork, driving/computer work. Therapeutic Activities:    [] (29771 or 41539) Provided verbal/tactile cueing for activities related to improving balance, coordination, kinesthetic sense, posture, motor skill, proprioception and motor activation to allow for proper function of scapular, scapulothoracic and UE control with self care, carrying, lifting, driving/computer work.      Home Exercise Program:    [x] (93272) Reviewed/Progressed HEP activities related to strengthening, flexibility, endurance, ROM of scapular, scapulothoracic and UE control with self care, reaching, carrying, lifting, house/yardwork, driving/computer work  [] (18946) Reviewed/Progressed HEP activities related to improving balance, coordination, kinesthetic sense, posture, motor skill, proprioception of scapular, scapulothoracic and UE control with self care, reaching, carrying, lifting, house/yardwork, driving/computer work      Manual Treatments:  PROM / STM / Oscillations-Mobs:  G-I, II, III, IV (PA's, Inf., Post.)  [x] (09602) Provided manual therapy to mobilize soft tissue/joints of cervical/CT, scapular GHJ and UE for the purpose of modulating pain, promoting relaxation,  increasing ROM, reducing/eliminating soft tissue swelling/inflammation/restriction, improving soft tissue extensibility and allowing for proper ROM for normal function with self care, reaching, carrying, lifting, house/yardwork, driving/computer work    Modalities:    Ice - 15'      Charges: KX  Timed Code Treatment Minutes: 32   Total Treatment Minutes: 67       [] EVAL - LOW (48583)   [] EVAL - MOD (72862)  [] EVAL - HIGH (01904)  [] RE-EVAL (28106)  [x] DU(48595) x  1   [] IONTO  [] NMR (12680) x      [] VASO  [x] Manual (15089) x  1    [x] Other: group  [] TA x       [] Mech Traction (81875)  [] ES(attended) (01203)      [] ES (un) (33432):     GOALS:  Patient stated goal: regain full range of motion in R shoulder     Therapist goals for Patient:   Short Term Goals: To be achieved in: 2 weeks  1. Independent in HEP and progression per patient tolerance, in order to prevent re-injury. MET   2. Patient will have a decrease in pain to facilitate improvement in movement, function, and ADLs as indicated by Functional Deficits. MET  3. Patient demonstrates understanding of and compliance with precautions following surgery. MET        Long Term Goals: To be achieved in: 4 months  1. Disability index score of 20% or less for the UEFI to assist with reaching prior level of function. PROGRESSING  2. Patient will demonstrate increased AROM to 150 shoulder elevation to allow for proper joint functioning as indicated by patients Functional Deficits. PROM PROGRESSING  3.  Patient will demonstrate an increase in Strength to tolerate submax strength testing and at least 4/5 to allow for proper functional mobility as indicated by patients Functional Deficits. NT  4. Patient will return to functional activities including dressing, bathing and sleeping without increased symptoms or restriction. PROGRESSING  5. Patient will return to work and going to [facility] [employer] without increased symptoms or restriction. Progression Towards Functional goals:  [x] Patient is progressing as expected towards functional goals listed. [] Progression is slowed due to complexities listed. [] Progression has been slowed due to co-morbidities. [] Plan just implemented, too soon to assess goals progression  [] Other:     Persisting Functional Limitations/Impairments:  []Sitting []Standing   []Walking []Squatting/bending    []Stairs [x]ADL's    []Transfers [x]Reaching  [x]Housework [x]Job related tasks  [x]Driving [x]Sports/Recreation   []Other:    ASSESSMENT:    Treatment/Activity Tolerance:  [x] Patient tolerated treatment well [] Patient limited by fatique  [] Patient limited by pain  [] Patient limited by other medical complications  [x] Other: Pt. With improved tolerance for therapy today due to decreased back pain. Able to resume all previous strengthening activities without increased back pain. Pt. Demonstrates improvement in shoulder passive mobility with only slight deficits at end ranges and decreased pain throughout. Pt. Unable to reach overhead independently at this time. Pt. Ritter Nip continues require occasional tactile cueing for correct muscle activation and control with exercises. Pt. Fatigued quickly with resuming SL ER. Pt. Requires continued progression of strength and mobility to improve shoulder function to prior level without pain.        Prognosis: [x] Good [] Fair  [] Poor    Patient Requires Follow-up: [x] Yes  [] No    Return to Play:    [x]  N/A     []  Stage 1: Intro to Strength   []  Stage 2: Dynamic Strength and Intro to Plyometrics   []  Stage 3: Advanced Plyometrics and Intro to Throwing   []  Stage 4: Sport specific Training/Return to Sport     []  Ready to Return to Play, [employer]   []  Not Ready for Return to Sports   Comments:      PLAN: 1-2x/wk  [x] Continue per plan of care [] Alter current plan (see comments)  [] Plan of care initiated [] Hold pending MD visit [] Discharge    Electronically signed by: Romain Whitfield PT, DPT        MEDICARE CAP EXCEPTION DOCUMENTATION      I certify that this patient meets one of the below criteria necessary for becoming an exception to the initial Medicare cap ($1980) on therapy services:    [x]  The patient has a musculoskeletal condition(s) with a corresponding ICD-10 code that is of complexity and severity that require skilled therapeutic intervention. []  The patient has associated co-morbidities along with primary diagnosis which significantly impact the rate of recovery and contribute to complexities that require skilled therapeutic intervention. []  The patient has associated barriers that influence rehabilitation progression. These include:  Mental/cognitive disorder, social support, self-efficacy/motivation, prognosis, time since onset/acuity. [x]  The patient had a prior episode of outpatient therapy during this calendar year for a different condition. Current diagnosis requires skilled therapeutic intervention. [] The patient requires ongoing skilled intervention in order to prevent decline of function and worsening of symptoms. Therapy will be administered sparingly.

## 2017-11-09 ENCOUNTER — HOSPITAL ENCOUNTER (OUTPATIENT)
Dept: PHYSICAL THERAPY | Age: 72
Discharge: HOME | End: 2017-11-09
Admitting: ORTHOPAEDIC SURGERY

## 2017-11-09 NOTE — FLOWSHEET NOTE
[facility]  Orthopaedics and Sports Rehabilitation, [place]      Physical Therapy Daily Treatment Note  Date:  2017    Patient Name:  Raleigh Tavarez    :  1945  MRN: [id number]  Medical/Treatment Diagnosis Information:  · Diagnosis: M75.121 (ICD-10-CM) - Complete tear of right rotator cuff; R rotator cuff revision, biceps tenotomy dos: 17  · Treatment Diagnosis: M25.611 R shoulder stiffness; M62.519 shoulder weakness  Insurance/Certification information:  PT Insurance Information: Medicare  Physician Information:  Referring Practitioner: Dr. Kena Cates of care signed (Y/N): Y    Date of Patient follow up with Physician:     G-Code (if applicable):      Date G-Code Applied:         Progress Note: []  Yes  [x]  No  Next due by: Visit #20    Latex Allergy:  [x]NO      []YES  Preferred Language for Healthcare:   [x]English       []other:    Visit # Insurance Allowable   17 Medicare Guidelines     Pain level:  0.5/10 shoulder; 1/10 back     SUBJECTIVE:  Pt. Reports that she has minimal pain in her shoulder at this time. Pt. Notes that she has increased shoulder pain with reaching activities both behind the back and overhead. Pt. Reports compliance with HEP. Pt. States that she will be out of town next week for a work trip.           OBJECTIVE: 10/10  Observation:     Incisions: closed and healing  Test measurements:      PROM AROM - NT     L R L R   Shoulder Flexion    156       Shoulder Abduction    148        Shoulder External Rotation     60       Shoulder Internal Rotation     -25              RESTRICTIONS/PRECAUTIONS: R rotator cuff revision    Exercises/Interventions:   Therapeutic Exercise  Resistance / level Sets/sec Reps Notes      Table slides - flexion, abduction  10\" 10 ^   ER ranger 90 degrees 10\" 10 ^10/19   IR towel  10\" 10 Start 10/3   Wall walks  10\" 10 Start 10/10          Scapular retraction silver 3 10 ^10/17   shrugs 5# 3 10 ^          triceps silver 3 10 testing and at least 4/5 to allow for proper functional mobility as indicated by patients Functional Deficits. NT  4. Patient will return to functional activities including dressing, bathing and sleeping without increased symptoms or restriction. PROGRESSING  5. Patient will return to work and going to the [employer] without increased symptoms or restriction. Progression Towards Functional goals:  [x] Patient is progressing as expected towards functional goals listed. [] Progression is slowed due to complexities listed. [] Progression has been slowed due to co-morbidities. [] Plan just implemented, too soon to assess goals progression  [] Other:     Persisting Functional Limitations/Impairments:  []Sitting []Standing   []Walking []Squatting/bending    []Stairs [x]ADL's    []Transfers [x]Reaching  [x]Housework [x]Job related tasks  [x]Driving [x]Sports/Recreation   []Other:    ASSESSMENT:    Treatment/Activity Tolerance:  [x] Patient tolerated treatment well [] Patient limited by fatique  [] Patient limited by pain  [] Patient limited by other medical complications  [x] Other:  Pt. Tolerated therapy today without complaints. Pt. Demonstrates improved shoulder mobility with rotational movements and is maintaining mobility with shoulder elevation. Pt. Challenged by current program. Pt. Requires tactile cueing for scapular stabilizer activities. Initiated horizontal abduction and rhythmic stabilization with pt. Noting fatigue only. Pt. With improved technique with resisted ER and decreased cueing required. Pt. Requires continued progression of shoulder strengthening in order to return to overhead activities without shoulder pain.        Prognosis: [x] Good [] Fair  [] Poor    Patient Requires Follow-up: [x] Yes  [] No    Return to Play:    [x]  N/A     []  Stage 1: Intro to Strength   []  Stage 2: Dynamic Strength and Intro to Plyometrics   []  Stage 3: Advanced Plyometrics and Intro to Throwing   []

## 2017-11-21 ENCOUNTER — OFFICE VISIT (OUTPATIENT)
Dept: ORTHOPEDIC SURGERY | Age: 72
End: 2017-11-21

## 2017-11-21 VITALS
BODY MASS INDEX: 34.53 KG/M2 | WEIGHT: 220 LBS | DIASTOLIC BLOOD PRESSURE: 89 MMHG | SYSTOLIC BLOOD PRESSURE: 145 MMHG | HEIGHT: 67 IN | HEART RATE: 78 BPM

## 2017-11-21 DIAGNOSIS — G89.29 CHRONIC RIGHT SHOULDER PAIN: Primary | ICD-10-CM

## 2017-11-21 DIAGNOSIS — Z98.890 S/P ROTATOR CUFF REPAIR: ICD-10-CM

## 2017-11-21 DIAGNOSIS — M25.511 CHRONIC RIGHT SHOULDER PAIN: Primary | ICD-10-CM

## 2017-11-21 PROCEDURE — 1090F PRES/ABSN URINE INCON ASSESS: CPT | Performed by: PHYSICIAN ASSISTANT

## 2017-11-21 PROCEDURE — G8400 PT W/DXA NO RESULTS DOC: HCPCS | Performed by: PHYSICIAN ASSISTANT

## 2017-11-21 PROCEDURE — 4040F PNEUMOC VAC/ADMIN/RCVD: CPT | Performed by: PHYSICIAN ASSISTANT

## 2017-11-21 PROCEDURE — 1036F TOBACCO NON-USER: CPT | Performed by: PHYSICIAN ASSISTANT

## 2017-11-21 PROCEDURE — G8427 DOCREV CUR MEDS BY ELIG CLIN: HCPCS | Performed by: PHYSICIAN ASSISTANT

## 2017-11-21 PROCEDURE — 3017F COLORECTAL CA SCREEN DOC REV: CPT | Performed by: PHYSICIAN ASSISTANT

## 2017-11-21 PROCEDURE — G8484 FLU IMMUNIZE NO ADMIN: HCPCS | Performed by: PHYSICIAN ASSISTANT

## 2017-11-21 PROCEDURE — 3014F SCREEN MAMMO DOC REV: CPT | Performed by: PHYSICIAN ASSISTANT

## 2017-11-21 PROCEDURE — 1123F ACP DISCUSS/DSCN MKR DOCD: CPT | Performed by: PHYSICIAN ASSISTANT

## 2017-11-21 PROCEDURE — G8417 CALC BMI ABV UP PARAM F/U: HCPCS | Performed by: PHYSICIAN ASSISTANT

## 2017-11-21 PROCEDURE — 99213 OFFICE O/P EST LOW 20 MIN: CPT | Performed by: PHYSICIAN ASSISTANT

## 2017-11-21 NOTE — PATIENT INSTRUCTIONS
PATIENT INSTRUCTIONS  For Wolfgang David  Office Visit with Triston Mann PA-C on 11/21/2017    Activity & PT Instructions:   No heavy lifting, overhead or strenuous activity with operated/involved arm   Avoid painful activities   No sports until cleared by MD/PT   No driving if you are taking narcotic pain medications or muscle relaxers   PT or Home Exercise Program for 3 months post-op    Note: PT is usually twice a week for 3 months (will be extended if necessary)    Wound Instructions:   Use Vit E oil on incisions once a day   If in the sun, protect incisions from sun by using:     Sunscreen 30-50 SPF, reapply regularly. Ice to the shoulder for 15  20 minutes 3 x day    (\"ICE IS YOUR FRIEND\": try using a bag of frozen peas or corn)     Call the office ([phone], Option 3) if:     The incision becomes red, swollen or develops drainage. You develop a fever greater than 101 degrees. Follow-up Care:   I have asked Wolfgang David to schedule a follow-up appointment in 2 months if needed. She is specifically instructed to contact the office between now & her scheduled appointment if she has concerns related to her condition. She is welcome to call for an appointment sooner if she has any additional concerns or questions. Medication Instructions: Allergies: Bacitracin; Doxycycline; Nitrofuran derivatives; Amoxicillin; Ampicillin; Betadine  [povidone iodine]; Penicillins; and Percocet [oxycodone-acetaminophen]    Any prescriptions given to you must be used as directed. Narcotic prescriptions will be printed out and given to you in the office. Non-narcotic prescriptions will be sent to your pharmacy for pickup to be use as directed unless you request a printed prescription. If you have any concerns, questions or require refills, please contact our office. If you experience any adverse reactions, stop the medication and call our office immediately.     PATIENT REMINDER:   Carry a list pile your medication. This also will protect you from being a target of crime. [x] Dispose of any unused medications properly. Do not flush the medications. Look for appropriate waste collection programs in your community and drug take back events. [x] DO NOT drive while taking narcotic pain medication or muscle relaxers. FALL PREVENTION:  SIMPLE TIPS    Vitamin D3:  Over-the-counter supplement of Vitamin D3, (at least 2,000 IU daily). Vitamin D3 is widely available without a prescription at pharmacies and buying clubs ([facility], [facility]) and on-line at sites such as Amazon.com. It comes in a variety of formulations (tablets, gelcaps, liquid) and doses (1,000 IU, 2,000 IU, 4,000 IU, 5,000 IU and even higher). The right dose for most people is 2,000 IU per day but higher doses are sometimes needed. You can take your vitamin D3 at any time of the day, with or without food, with or without calcium. Because vitamin D is long acting, if you miss your vitamin D on one day you can double up the dose on a later day. Exercise: Low impact exercise programs such as [employer]. Chair Raise Exercise. Yoga. Adult fitness classes at the  or community [place]. Physical Therapy: Formal physical therapy program to strengthen your lower extremities, improve your balance and gait. Home Assessment: Remove clutter and tripping hazards: loose/throw rugs, cords or cables. Install or placement of railings, grab bars around steps/stairs and in the bathroom (toilet, bath tub, sink). Improve lighting. Foot Wear:  Stable, well fitting. Avoid loose straps or ties, slippery soles. Vision/Eye Check Up: Have your vision checked yearly. Resources:  www.cdc.gov/injury/STEADI    1). STEADI: Stay Independent Self Risk Assessment    2). CDC Handouts:  How to prevent falls, Home Safety Fall Prevention         If you or someone you know struggles with weight issues and joint pain, please check out this important documentary:      \"Start Moving Start Living\" =  Http://startmovingstHoneywellliving.[employer]       (YOUTUBE video SMSL FULL SD)

## 2017-11-21 NOTE — LETTER
FAXED/SENT TO Dr Dre Lovett DO  11/21/17, 4:18 PM        Frank Holliday PA-C  Orthopaedic Surgery & Sports Medicine      Dear Dr Dre Lovett DO,    Thank you very much for your referral of Ms. Kizzy Vidal to me for evaluation and treatment. Attached below is my report and recommendations from [facility] [place] most recent office visit. I appreciate your confidence in me and thank you for allowing me the opportunity to care for your patients. If I can be of any further assistance to you on this or any other patient, please do not hesitate to contact me. Sincerely,    Frank Holliday PA-C  Electronically signed by Frank Holliday PA-C on 11/21/2017 at 4:18 PM     Contact Information:  Tomas Feldman,  &  Clinical Staff  [phone], [address] [facility] [place]  Orthopaedic Surgery & Sports Medicine       [address] [facility] OFFICE    [facility] OFFICE  [address] [place] [address], [address] [place] [address], [address] [place] [address]    [phone] Option 3   [phone] Option [phone] (fax)    [phone] (fax)       PATIENT: Kizzy Vidal    67 y.o.  female  [facility]: 1945   MRN:  [id number]       Date of current encounter: 11/21/2017  This encounter is evaluated as a:        New Patient Visit     Established Patient Visit    Post-Op Visit      Consult: requested by          Worker's Comp       Patient's PCP is Dr. Dre Lovett DO      SURGICAL FINDINGS: S/P Right rotator cuff repair, arthroscopy, debridement and Biceps tenotomy 7/24/2017             Subjective:     Chief Complaint   Patient presents with    Shoulder Pain     Ongoing evaluation and treatment of the right shoulder       HPI:  s/p Right Shoulder Surgery: Arthroscopy, Debridement, Rotator Cuff Tear Repair, Biceps Tenotomy on 7/24/2017.     Kizzy Vidal returns to the office today in follow-up of her right shoulder surgery. She is doing her physical therapy exercises. She does feel her shoulder continues to slowly improve after surgery. She rates her pain as a 2 out of 10 at rest and a 4 out of 10 with activity. She describes her pain as aching and popping. It is intermittent. Stretching, exercise, reaching overhead and lifting aggravate her pain. Rest, ice and exercise help relieve her pain. She does have night pain. She does not have morning stiffness. She has not yet gone back to swimming however is working her way towards that goal.    PAIN ASSESSMENT:   Pain Assessment  Location of Pain: Shoulder  Location Modifiers: Right  Severity of Pain: 4  Quality of Pain: Aching, Popping  Duration of Pain: Other (Comment) (varies)  Frequency of Pain: Intermittent  Date Pain First Started:  (from sx)  Aggravating Factors: Stretching, Exercise, Other (Comment) (reaching overhead, lifting)  Limiting Behavior: Some  Relieving Factors: Exercise, Ice, Rest  Result of Injury: No  Work-Related Injury: No  Are there other pain locations you wish to document?: No    Patient Active Problem List   Diagnosis    Obesity (BMI 30.0-34. 9)    Primary osteoarthritis of right knee    Chondromalacia patellae of right knee    Primary osteoarthritis of left knee    Chondromalacia patellae of left knee    Status post fall 10/6/2015    Hamstring tightness of both lower extremities    Hypertension    Osteoporosis    Contact ulcer of vocal folds    S/P right knee arthroscopy, chondroplasty, synovectomy, partial medial and lateral meniscectomy 12/21/2015    S/P left knee arthroscopy, chondroplasty, synovectomy, partial lateral meniscectomy and removal of loose bodies 7/18/2016    Acute deep vein thrombosis (DVT) of distal end of left lower extremity ([address]    History of right shoulder surgery by Dr. Virgilio Haines in 2009    Neck pain    Chronic right shoulder pain    Neck arthritis ([address] C5C6-C7    Chronic pain of left knee  Chronic pain of right knee    S/P Right rotator cuff repair, arthroscopy, debridement and Biceps tenotomy 7/24/2017     Past Medical History:   Diagnosis Date    Arthritis     Connective tissue anomaly BEHSUTS DX    Hx of blood clots     left leg after knee surgery    Hypertension     no medications currently    Knee pain     LPRD (laryngopharyngeal reflux disease)     Rotator cuff injury     Vascular disease      Past Surgical History:   Procedure Laterality Date    CATARACT REMOVAL WITH IMPLANT Bilateral     FOOT NEUROMA SURGERY      KNEE ARTHROSCOPY  07/18/2016    left    KNEE SURGERY Right 12 21 15    Arthroscopy, Chondroplasty,Synovectomy,, medial meniectomy    SHOULDER ARTHROSCOPY Right 2009    by Dr. Rohan Ba ARTHROSCOPY Left     SHOULDER SURGERY  07/24/2016    RIGHT SHOULDER ARTHROSCOPY WITH DEBRIDEMENT, ROTATOR CUFF       Allergies:  Bacitracin; Doxycycline; Nitrofuran derivatives; Amoxicillin; Ampicillin; Betadine  [povidone iodine]; Penicillins; and Percocet [oxycodone-acetaminophen]    Medications:  Prior to Visit Medications    Medication Sig Taking? Authorizing Provider   HYDROcodone-acetaminophen (NORCO) 5-325 MG per tablet Take 1 tablet by mouth every 6 hours as needed for Pain . Earliest Fill Date: 8/3/17  RACHEL Franco   meloxicam (MOBIC) 15 MG tablet Take 1 tablet by mouth daily Take one tab 15 mg by mouth daily with food  Christian Blanca MD   Ca Phosphate-Cholecalciferol 200-200 MG-UNIT CHEW Take by mouth  Historical Provider, MD   Multiple Vitamins-Minerals (MULTIVITAMIN & MINERAL PO) Take 1 tablet by mouth daily   Historical Provider, MD   raloxifene (EVISTA) 60 MG tablet Take 60 mg by mouth daily. Historical Provider, MD   omeprazole (PRILOSEC) 20 MG capsule Take 20 mg by mouth daily.     Historical Provider, MD     Social History     Social History    Marital status: Single     Spouse name: N/A    Number of children: N/A    Years of education: N/A Occupational History    Not on file. Social History Main Topics    Smoking status: Never Smoker    Smokeless tobacco: Never Used    Alcohol use 0.6 oz/week     1 Glasses of wine per week      Comment: wine daily    Drug use: No    Sexual activity: Not on file     Other Topics Concern    Not on file     Social History Narrative    No narrative on file     No family history on file. Review of Systems (ROS):    Performed. Karin Elliott's review of systems has been performed by intake and observation. The most recent ROS was scanned into the media tab of the chart on 5/4/2017. She has been instructed to contact her primary care physician regarding ROS issues if not already being addressed at this time. There are no recent changes. Objective:   Physical Exam  Vital Signs:  BP (!) 145/89   Pulse 78   Ht 5' 7\" (1.702 m)   Wt 220 lb (99.8 kg)   BMI 34.46 kg/m²      Constitution:  Generally, Bryan Medrano is  alert,  appears stated age, and  in no distress. Her general body habitus is  Cachectic   Thin   Normal   Obese   Morbidly Obese    Head:  Normocephalic  Eyes:  Extra-occular muscles intact     Wears glasses  Left Ear:  External Ear normal   Right Ear:  External Ear normal   Nose:  Normal  Mouth:  Oral mucosa moist   No perioral lesions    Pulmonary:  Respirations unlabored and regular  Skin:  Warm  Well perfused     Psychiatric:    Good judgement and insight   Oriented to  person,  place, and  time. Mood appropriate for circumstances.     Gait:   Gait is  Normal   Impaired  Assistive Device:  None   Knee Brace   Cane   Crutches    Walker    Wheelchair   Other    Neck Exam:  Neck Inspection:   Skin intact without abrasion, lacerations or rashes   Normal neck alignment   Scar / Surgical incision:   Anterior  Posterior   Ecchymosis:  none   mild   moderate   severe   Atrophy:   none  mild   moderate   severe       ORTHOPAEDIC SHOULDER EXAM: RIGHT   Inspection:

## 2017-11-21 NOTE — PROGRESS NOTES
Abel Mitchell PA-C  Orthopaedic Surgery & Sports Medicine      [x] [address] [facility] [place] OFFICE   [] [place] [facility] OFFICE  [address], [address] [place] [address], [address]    [phone] Option 3   [phone] Option [phone] (fax)    [phone] (fax)       PATIENT: Alyce Lloyd    67 y.o.  female  [facility]: 1945   MRN:  [id number]       Date of current encounter: 11/21/2017  This encounter is evaluated as a:       [] New Patient Visit    [x] Established Patient Visit   [] Post-Op Visit     [] Consult: requested by         [] Worker's Comp       Patient's PCP is Dr. Jess Ca DO      SURGICAL FINDINGS: S/P Right rotator cuff repair, arthroscopy, debridement and Biceps tenotomy 7/24/2017             Subjective:     Chief Complaint   Patient presents with    Shoulder Pain     Ongoing evaluation and treatment of the right shoulder       HPI:  s/p Right Shoulder Surgery: Arthroscopy, Debridement, Rotator Cuff Tear Repair, Biceps Tenotomy on 7/24/2017. Alyce Lloyd returns to the office today in follow-up of her right shoulder surgery. She is doing her physical therapy exercises. She does feel her shoulder continues to slowly improve after surgery. She rates her pain as a 2 out of 10 at rest and a 4 out of 10 with activity. She describes her pain as aching and popping. It is intermittent. Stretching, exercise, reaching overhead and lifting aggravate her pain. Rest, ice and exercise help relieve her pain. She does have night pain. She does not have morning stiffness.  She has not yet gone back to swimming however is working her way towards that goal.    PAIN ASSESSMENT:   Pain Assessment  Location of Pain: Shoulder  Location Modifiers: Right  Severity of Pain: 4  Quality of Pain: Aching, Popping  Duration of Pain: Other (Comment) (varies)  Frequency of Pain: Intermittent  Date Pain First Started:  (from sx)  Aggravating Factors: Stretching, Exercise, Other (Comment) (reaching overhead, lifting)  Limiting Behavior: Some  Relieving Factors: Exercise, Ice, Rest  Result of Injury: No  Work-Related Injury: No  Are there other pain locations you wish to document?: No    Patient Active Problem List   Diagnosis    Obesity (BMI 30.0-34. 9)    Primary osteoarthritis of right knee    Chondromalacia patellae of right knee    Primary osteoarthritis of left knee    Chondromalacia patellae of left knee    Status post fall 10/6/2015    Hamstring tightness of both lower extremities    Hypertension    Osteoporosis    Contact ulcer of vocal folds    S/P right knee arthroscopy, chondroplasty, synovectomy, partial medial and lateral meniscectomy 12/21/2015    S/P left knee arthroscopy, chondroplasty, synovectomy, partial lateral meniscectomy and removal of loose bodies 7/18/2016    Acute deep vein thrombosis (DVT) of distal end of left lower extremity ([address]    History of right shoulder surgery by Dr. Radha Hough in 2009    Neck pain    Chronic right shoulder pain    Neck arthritis ([address] C5C6-C7    Chronic pain of left knee    Chronic pain of right knee    S/P Right rotator cuff repair, arthroscopy, debridement and Biceps tenotomy 7/24/2017     Past Medical History:   Diagnosis Date    Arthritis     Connective tissue anomaly BEHSUTS DX    Hx of blood clots     left leg after knee surgery    Hypertension     no medications currently    Knee pain     LPRD (laryngopharyngeal reflux disease)     Rotator cuff injury     Vascular disease      Past Surgical History:   Procedure Laterality Date    CATARACT REMOVAL WITH IMPLANT Bilateral     FOOT NEUROMA SURGERY      KNEE ARTHROSCOPY  07/18/2016    left    KNEE SURGERY Right 12 21 15    Arthroscopy, Chondroplasty,Synovectomy,, medial meniectomy    SHOULDER ARTHROSCOPY Right 2009    by Dr. Emmett Ritter ARTHROSCOPY Left     SHOULDER SURGERY  07/24/2016    RIGHT SHOULDER ARTHROSCOPY WITH DEBRIDEMENT, ROTATOR CUFF       Allergies:  Bacitracin; Doxycycline; Nitrofuran derivatives; Amoxicillin; Ampicillin; Betadine  [povidone iodine]; Penicillins; and Percocet [oxycodone-acetaminophen]    Medications:  Prior to Visit Medications    Medication Sig Taking? Authorizing Provider   HYDROcodone-acetaminophen (NORCO) 5-325 MG per tablet Take 1 tablet by mouth every 6 hours as needed for Pain . Earliest Fill Date: 8/3/17  RACHEL Cunningham   meloxicam (MOBIC) 15 MG tablet Take 1 tablet by mouth daily Take one tab 15 mg by mouth daily with food  Katelynn De La Rosa MD   Ca Phosphate-Cholecalciferol 200-200 MG-UNIT CHEW Take by mouth  Historical Provider, MD   Multiple Vitamins-Minerals (MULTIVITAMIN & MINERAL PO) Take 1 tablet by mouth daily   Historical Provider, MD   raloxifene (EVISTA) 60 MG tablet Take 60 mg by mouth daily. Historical Provider, MD   omeprazole (PRILOSEC) 20 MG capsule Take 20 mg by mouth daily. Historical Provider, MD     Social History     Social History    Marital status: Single     Spouse name: N/A    Number of children: N/A    Years of education: N/A     Occupational History    Not on file. Social History Main Topics    Smoking status: Never Smoker    Smokeless tobacco: Never Used    Alcohol use 0.6 oz/week     1 Glasses of wine per week      Comment: wine daily    Drug use: No    Sexual activity: Not on file     Other Topics Concern    Not on file     Social History Narrative    No narrative on file     No family history on file. Review of Systems (ROS):    [x]Performed. Lamonte Elliott's review of systems has been performed by intake and observation. The most recent ROS was scanned into the media tab of the chart on 5/4/2017. She has been instructed to contact her primary care physician regarding ROS issues if not already being addressed at this time. There are no recent changes.      Objective:   Physical Exam  Vital Signs:  BP (!) 145/89   Pulse 78   Ht 5' 7\" (1.702 m)   Wt 220 lb (99.8 kg)   BMI 34.46 kg/m²     Constitution:  Generally, Jarred Tracy is [x] alert, [x] appears stated age, and [x] in no distress. Her general body habitus is [] Cachectic  [] Thin  [x] Normal  [x] Obese  [] Morbidly Obese    Head: [x] Normocephalic  Eyes: [x] Extra-occular muscles intact   [x]  Wears glasses  Left Ear: [x] External Ear normal   Right Ear: [x] External Ear normal   Nose: [x] Normal  Mouth: [x] Oral mucosa moist  [x] No perioral lesions    Pulmonary: [x] Respirations unlabored and regular  Skin: [x] Warm [x] Well perfused     Psychiatric:   [x] Good judgement and insight  [x] Oriented to [x] person, [x] place, and [x] time. [x] Mood appropriate for circumstances.     Gait:   Gait is [x] Normal  [] Impaired  Assistive Device: [x] None  [] Knee Brace  [] Cane  [] Crutches   [] Terrie Billie   [] Wheelchair  [] Other    Neck Exam:  Neck Inspection:  [x] Skin intact without abrasion, lacerations or rashes  [x] Normal neck alignment  [] Scar / Surgical incision:  [] Anterior [] Posterior  [x] Ecchymosis: [x] none  [] mild  [] moderate  [] severe  [x] Atrophy:  [x] none [] mild  [] moderate  [] severe       ORTHOPAEDIC SHOULDER EXAM: RIGHT   Inspection:  [x] Skin intact without abrasion, lacerations or rashes  [] Incisions with no signs of infection (red, warmth, drainage) and healing well  [x] Incisions well healed  [x] Ecchymosis:  [x] none  [] mild  [] moderate  [] severe  [x] Scar / Surgical incision(s):  [x] A-Scope Portals  [x] Open Surgical Incision(s):    Range of Motion:  [x] Normal ROM    [] Deferred: acute injury/post-surgery/pain   [] Limited ROM:  [] mild  [] moderate  [] severe    [] secondary to normal post-op pain/limitations    Palpation:   [] No Tenderness  [x] Tenderness:  [x] mild: trapezius  [] moderate  [] severe    [] secondary to normal post-op pain/limitations  [] A-C Joint  [] Proximal Biceps     Provocative Tests for Rotator Cuff:   [x] Negative: : medication private. This is to avoid individuals from taking your medication without your knowledge. This medication is highly sought after and locking your prescriptions will protect you from being a target of crime. [x] DO NOT stock pile your medication. This also will protect you from being a target of crime. [x] Dispose of any unused medications properly. Do not flush the medications. Look for appropriate waste collection programs in your community and drug take back events. [x] DO NOT drive while taking narcotic pain medication or muscle relaxers. FALL PREVENTION:  SIMPLE TIPS    Vitamin D3:  Over-the-counter supplement of Vitamin D3, (at least 2,000 IU daily). Vitamin D3 is widely available without a prescription at pharmacies and buying clubs ([facility], [facility]) and on-line at sites such as [employer].com. It comes in a variety of formulations (tablets, gelcaps, liquid) and doses (1,000 IU, 2,000 IU, 4,000 IU, 5,000 IU and even higher). The right dose for most people is 2,000 IU per day but higher doses are sometimes needed. You can take your vitamin D3 at any time of the day, with or without food, with or without calcium. Because vitamin D is long acting, if you miss your vitamin D on one day you can double up the dose on a later day. Exercise: Low impact exercise programs such as [employer]. Chair Raise Exercise. Yoga. Adult fitness classes at the  or community center. Physical Therapy: Formal physical therapy program to strengthen your lower extremities, improve your balance and gait. Home Assessment: Remove clutter and tripping hazards: loose/throw rugs, cords or cables. Install or placement of railings, grab bars around steps/stairs and in the bathroom (toilet, bath tub, sink). Improve lighting. Foot Wear:  Stable, well fitting. Avoid loose straps or ties, slippery soles.     Vision/Eye Check Up: Have your vision checked

## 2017-11-22 ENCOUNTER — HOSPITAL ENCOUNTER (OUTPATIENT)
Dept: PHYSICAL THERAPY | Age: 72
Discharge: HOME | End: 2017-11-22
Admitting: ORTHOPAEDIC SURGERY

## 2017-11-22 NOTE — FLOWSHEET NOTE
[facility]  Orthopaedics and Sports Rehabilitation, [place]      Physical Therapy Daily Treatment Note  Date:  2017    Patient Name:  Praveen Hopper    :  1945  MRN: [id number]  Medical/Treatment Diagnosis Information:  · Diagnosis: M75.121 (ICD-10-CM) - Complete tear of right rotator cuff; R rotator cuff revision, biceps tenotomy dos: 17  · Treatment Diagnosis: M25.611 R shoulder stiffness; M62.519 shoulder weakness  Insurance/Certification information:  PT Insurance Information: Medicare  Physician Information:  Referring Practitioner: Dr. Eva Vaca of care signed (Y/N): Y    Date of Patient follow up with Physician:     G-Code (if applicable):      Date G-Code Applied:         Progress Note: []  Yes  [x]  No  Next due by: Visit #20    Latex Allergy:  [x]NO      []YES  Preferred Language for Healthcare:   [x]English       []other:    Visit # Insurance Allowable   18 Medicare Guidelines     Pain level:  0/10 shoulder     SUBJECTIVE:  Pt. Reports that her shoulder is feeling good and she does not have any pain at this time. Pt. Reports that her back is also feeling better. Pt. States that she saw referring MD staff yesterday who are pleased with her progress. Pt. Reports compliance with HEP most days while she was away on her work trip.           OBJECTIVE:   Observation:    Test measurements:      PROM AROM     L R L R   Shoulder Flexion    ~160   ~140    Shoulder Abduction    ~150    ~135   Shoulder External Rotation    ~75       Shoulder Internal Rotation    ~40    ~T8          RESTRICTIONS/PRECAUTIONS: R rotator cuff revision    Exercises/Interventions:   Therapeutic Exercise  Resistance / level Sets/sec Reps Notes      Table slides - flexion, abduction  10\" 10 ^   ER ranger 90 degrees 10\" 10 ^10/19   IR towel  10\" 10 Start 10/3   Wall walks  10\" 10 Start 10/10          Scapular retraction silver 3 10 ^10/17   shrugs 6# 3 10 ^          triceps silver 3 10 submax strength testing and at least 4/5 to allow for proper functional mobility as indicated by patients Functional Deficits. NT  4. Patient will return to functional activities including dressing, bathing and sleeping without increased symptoms or restriction. PROGRESSING  5. Patient will return [facility] without increased symptoms or restriction. Progression Towards Functional goals:  [x] Patient is progressing as expected towards functional goals listed. [] Progression is slowed due to complexities listed. [] Progression has been slowed due to co-morbidities. [] Plan just implemented, too soon to assess goals progression  [] Other:     Persisting Functional Limitations/Impairments:  []Sitting []Standing   []Walking []Squatting/bending    []Stairs [x]ADL's    []Transfers [x]Reaching  [x]Housework [x]Job related tasks  [x]Driving [x]Sports/Recreation   []Other:    ASSESSMENT:    Treatment/Activity Tolerance:  [x] Patient tolerated treatment well [] Patient limited by fatique  [] Patient limited by pain  [] Patient limited by other medical complications  [x] Other: Pt. Tolerated therapy today without complaints. Pt. Demonstrates improved shoulder mobility with both active and passive motion. Pt. Able to increased some resistance activities but continues to be challenged with prone scapular activities and SL ER. Pt. Fatigues quickly with resistance activities. Pt. Continues to have difficulty with rhythmic stabilization. Pt. Requires continued progression of shoulder functional strengthening in order to return to daily lifting and reaching activities.      Prognosis: [x] Good [] Fair  [] Poor    Patient Requires Follow-up: [x] Yes  [] No    Return to Play:    [x]  N/A     []  Stage 1: Intro to Strength   []  Stage 2: Dynamic Strength and Intro to Plyometrics   []  Stage 3: Advanced Plyometrics and Intro to Throwing   []  Stage 4: Sport specific Training/Return to Sport     [] Ready to Return to Play, Meets All Above Stages   []  Not Ready for Return to Sports   Comments:      PLAN: 1-2x/wk  [x] Continue per plan of care [] Alter current plan (see comments)  [] Plan of care initiated [] Hold pending MD visit [] Discharge    Electronically signed by: Laila Gannon PT, DPT        MEDICARE CAP EXCEPTION DOCUMENTATION      I certify that this patient meets one of the below criteria necessary for becoming an exception to the initial Medicare cap ($1980) on therapy services:    [x]  The patient has a musculoskeletal condition(s) with a corresponding ICD-10 code that is of complexity and severity that require skilled therapeutic intervention. []  The patient has associated co-morbidities along with primary diagnosis which significantly impact the rate of recovery and contribute to complexities that require skilled therapeutic intervention. []  The patient has associated barriers that influence rehabilitation progression. These include:  Mental/cognitive disorder, social support, self-efficacy/motivation, prognosis, time since onset/acuity. [x]  The patient had a prior episode of outpatient therapy during this calendar year for a different condition. Current diagnosis requires skilled therapeutic intervention. [] The patient requires ongoing skilled intervention in order to prevent decline of function and worsening of symptoms. Therapy will be administered sparingly.

## 2017-11-24 ENCOUNTER — HOSPITAL ENCOUNTER (OUTPATIENT)
Dept: PHYSICAL THERAPY | Age: 72
Discharge: HOME | End: 2017-11-24
Admitting: ORTHOPAEDIC SURGERY

## 2017-11-24 NOTE — FLOWSHEET NOTE
[facility], [place]      Physical Therapy Daily Treatment Note  Date:  2017    Patient Name:  Praveen Hopper    :  1945  MRN: [id number]  Medical/Treatment Diagnosis Information:  · Diagnosis: M75.121 (ICD-10-CM) - Complete tear of right rotator cuff; R rotator cuff revision, biceps tenotomy dos: 17  · Treatment Diagnosis: M25.611 R shoulder stiffness; M62.519 shoulder weakness  Insurance/Certification information:  PT Insurance Information: Medicare  Physician Information:  Referring Practitioner: Dr. Eva Vaca of care signed (Y/N): Y    Date of Patient follow up with Physician:     G-Code (if applicable):      Date G-Code Applied:         Progress Note: []  Yes  [x]  No  Next due by: Visit #20 PROGRESS NOTE NPV    Latex Allergy:  [x]NO      []YES  Preferred Language for Healthcare:   [x]English       []other:    Visit # Insurance Allowable   19 Medicare Guidelines     Pain level:  0/10 shoulder     SUBJECTIVE:  Pt. Reports that her shoulder is feeling really good. She was able to play the drums last night with her family. Pt. Notes that she continues to have a little difficulty reaching all of the way overhead. Pt. Reports compliance with HEP.              OBJECTIVE:   Observation:    Test measurements:      PROM AROM     L R L R   Shoulder Flexion    ~160   ~140    Shoulder Abduction    ~150    ~135   Shoulder External Rotation    ~75       Shoulder Internal Rotation    ~40    ~T8          RESTRICTIONS/PRECAUTIONS: R rotator cuff revision    Exercises/Interventions:   Therapeutic Exercise  Resistance / level Sets/sec Reps Notes      Table slides - flexion, abduction  10\" 10 ^   ER ranger 90 degrees 10\" 10 ^10/19   IR towel  10\" 10 Start 10/3   Wall walks  10\" 10 Start 10/10          Scapular retraction silver 3 10 ^10/17   shrugs 6# 3 10 ^          triceps silver 3 10 ^10/17   biceps 6# 3 10 ^          IR band Black 3 10 ^11/22   ER band black 3 10 ^11/22            SL ER 3# 3 10 ^10/19   Serratus punches 3#  4# 2  1 10  10 ^11/24   Prone extension 4#  5# 2  1 10  10 ^11/24   Horizontal abduction 0# 3 10 Start 11/9          Neuromuscular Re-ed / Therapeutic Activities       Rhythmic stabilization Supine flex 90 30\" 3 Start 11/9                                             Manual Intervention       Shld /GH Mobs Gentle oscillation 2'     Post Cap mobs       Thoracic/Rib manipualtion       CT MT/Mobs       PROM MT Scap, flexion  rotation 8'              Patient Education:      Therapeutic Exercise and NMR EXR  [x] (67145) Provided verbal/tactile cueing for activities related to strengthening, flexibility, endurance, ROM  for improvements in scapular, scapulothoracic and UE control with self care, reaching, carrying, lifting, house/yardwork, driving/computer work. [x] (76378) Provided verbal/tactile cueing for activities related to improving balance, coordination, kinesthetic sense, posture, motor skill, proprioception  to assist with  scapular, scapulothoracic and UE control with self care, reaching, carrying, lifting, house/yardwork, driving/computer work. Therapeutic Activities:    [x] (22073 or 51913) Provided verbal/tactile cueing for activities related to improving balance, coordination, kinesthetic sense, posture, motor skill, proprioception and motor activation to allow for proper function of scapular, scapulothoracic and UE control with self care, carrying, lifting, driving/computer work.      Home Exercise Program:    [x] (33714) Reviewed/Progressed HEP activities related to strengthening, flexibility, endurance, ROM of scapular, scapulothoracic and UE control with self care, reaching, carrying, lifting, house/yardwork, driving/computer work  [] (58631) Reviewed/Progressed HEP activities related to improving balance, coordination, kinesthetic sense, posture, motor skill, proprioception of scapular, scapulothoracic and

## 2017-12-01 ENCOUNTER — HOSPITAL ENCOUNTER (OUTPATIENT)
Dept: PHYSICAL THERAPY | Age: 72
Discharge: HOME | End: 2017-12-06
Attending: ORTHOPAEDIC SURGERY | Admitting: ORTHOPAEDIC SURGERY

## 2017-12-05 ENCOUNTER — HOSPITAL ENCOUNTER (OUTPATIENT)
Dept: PHYSICAL THERAPY | Age: 72
Discharge: HOME | End: 2017-12-05
Admitting: ORTHOPAEDIC SURGERY

## 2017-12-05 NOTE — PLAN OF CARE
C[facility] 81      Physical Therapy Discharge Summary    Dear  Dr. Grant Yusuf,    We had the pleasure of treating the following patient for physical therapy services at [address] [place] [address]. A summary of our findings can be found in the discharge summary below. If you have any questions or concerns regarding these findings, please do not hesitate to contact me at the office phone number checked above. Thank you for the referral.     Physician Signature:________________________________Date:__________________  By signing above (or electronic signature), therapists plan is approved by physician      Overall Response to Treatment:   [x]Patient is responding well to treatment and improvement is noted with regards  to goals   [x]Patient should continue to improve in reasonable time if they continue HEP   []Patient has plateaued and is no longer responding to skilled PT intervention    []Patient is getting worse and would benefit from return to referring MD   []Patient unable to adhere to initial POC   [x]Other: Pt. Demonstrates improved shoulder mobility and strength. Pt. Should continue to improve with continued compliance with HEP.      Date range of Visits: 17-17    Total Visits: 20      Physical Therapy Daily Treatment Note  Date:  2017    Patient Name:  Rossi Rowell    :  1945  MRN: [id number]  Medical/Treatment Diagnosis Information:  · Diagnosis: M75.121 (ICD-10-CM) - Complete tear of right rotator cuff; R rotator cuff revision, biceps tenotomy dos: 17  · Treatment Diagnosis: M25.611 R shoulder stiffness; M62.519 shoulder weakness  Insurance/Certification information:  PT Insurance Information: Medicare  Physician Information:  Referring Practitioner: Dr. Cheikh Rowell of care signed (Y/N): Y    Date of Patient follow up with Physician:     G-Code (if applicable):      Date G-Code Applied:         Progress Note: [x]  Yes  []  No  Next due by: Visit #30    Latex Allergy:  [x]NO      []YES  Preferred Language for Healthcare:   [x]English       []other:    Visit # Insurance Allowable   20 Medicare Guidelines     Pain level:  1-2/10 shoulder     SUBJECTIVE:   Pt. Reports that her shoulder is overall feeling pretty good. She notes that she continues to have some tightness in shoulder that makes some activities such as hooking a bra a little more difficult. Pt. Reports compliance with HEP except for a couple of days when she was too sick to complete.            OBJECTIVE: 12/5  Observation:    Test measurements:      PROM AROM     L R L R   Shoulder Flexion    160   ~160    Shoulder Abduction    153    ~155   Shoulder External Rotation    85       Shoulder Internal Rotation    40    ~T6      Strength (0-5)  submax testing Left Right    Shoulder Flex  4 4   Shoulder Abd  4  4   Shoulder ER  4  4   Shoulder IR  4  4   Biceps  4  4   Triceps  4  4                 RESTRICTIONS/PRECAUTIONS: R rotator cuff revision    Exercises/Interventions:   Therapeutic Exercise  Resistance / level Sets/sec Reps Notes      Table slides - flexion, abduction  10\" 10 ^8/30   ER ranger 90 degrees 10\" 10 ^10/19   IR towel  10\" 10 Start 10/3   Wall walks  10\" 10 Start 10/10          Scapular retraction silver 3 10 ^10/17   shrugs 6# 3 10 ^11/22          triceps silver 3 10 ^10/17   biceps 6# 3 10 ^11/22          IR band Black 3 10 ^11/22   ER band black 3 10 ^11/22            SL ER 4# 3 10 ^12/5   Serratus punches 6# 3 10 ^12/5   Prone extension 6# 3 10 ^12/5   Horizontal abduction 0# 3 10 Start 11/9          Neuromuscular Re-ed / Therapeutic Activities       Rhythmic stabilization Supine flex 90 30\" 3 Start 11/9                                             Manual Intervention       Shld /GH Mobs Gentle oscillation 2'     Post Cap mobs       Thoracic/Rib manipualtion       CT MT/Mobs       PROM MT Scap, flexion  rotation 6'              Patient co-morbidities. [] Plan just implemented, too soon to assess goals progression  [] Other:     Persisting Functional Limitations/Impairments:  []Sitting []Standing   []Walking []Squatting/bending    []Stairs []ADL's    []Transfers []Reaching  []Housework []Job related tasks  []Driving [x]Sports/Recreation   []Other:    ASSESSMENT:    Treatment/Activity Tolerance:  [x] Patient tolerated treatment well [] Patient limited by fatique  [] Patient limited by pain  [] Patient limited by other medical complications  [x] Other: Pt. Tolerated therapy today without complaints. Pt. Demonstrates significant improvements in shoulder mobility and functional strength. Patient is currently independent with symptom management and home exercise program. Patient reports understanding of the importance of continued compliance with home exercise program after discharge. Patient should reach all long term goals with compliance to home exercise program upon discharge. Prognosis: [x] Good [] Fair  [] Poor    Patient Requires Follow-up: [] Yes  [x] No    Return to Play:    [x]  N/A     []  Stage 1: Intro to Strength   []  Stage 2: Dynamic Strength and Intro to Plyometrics   []  Stage 3: Advanced Plyometrics and Intro to Throwing   []  Stage 4: Sport specific Training/Return to Sport     []  Ready to Return to Play, [employer]   []  Not Ready for Return to Sports   Comments:      PLAN: D/C to HEP.  Pt to call with any questions and f/u with PT prn.  [] Continue per plan of care [] Alter current plan (see comments)  [] Plan of care initiated [] Hold pending MD visit [x] Discharge    Electronically signed by: Michael Castillo PT, DPT        MEDICARE CAP EXCEPTION DOCUMENTATION      I certify that this patient meets one of the below criteria necessary for becoming an exception to the initial Medicare cap ($1980) on therapy services:    [x]  The patient has a musculoskeletal condition(s) with a corresponding ICD-10 code that is of complexity and severity that require skilled therapeutic intervention. []  The patient has associated co-morbidities along with primary diagnosis which significantly impact the rate of recovery and contribute to complexities that require skilled therapeutic intervention. []  The patient has associated barriers that influence rehabilitation progression. These include:  Mental/cognitive disorder, social support, self-efficacy/motivation, prognosis, time since onset/acuity. [x]  The patient had a prior episode of outpatient therapy during this calendar year for a different condition. Current diagnosis requires skilled therapeutic intervention. [] The patient requires ongoing skilled intervention in order to prevent decline of function and worsening of symptoms. Therapy will be administered sparingly.

## 2018-01-15 ENCOUNTER — OFFICE VISIT (OUTPATIENT)
Dept: ORTHOPEDIC SURGERY | Age: 73
End: 2018-01-15

## 2018-01-15 VITALS
BODY MASS INDEX: 33.34 KG/M2 | SYSTOLIC BLOOD PRESSURE: 156 MMHG | HEART RATE: 80 BPM | WEIGHT: 220 LBS | DIASTOLIC BLOOD PRESSURE: 93 MMHG | HEIGHT: 68 IN

## 2018-01-15 DIAGNOSIS — Z98.890 S/P RIGHT KNEE ARTHROSCOPY: ICD-10-CM

## 2018-01-15 DIAGNOSIS — M22.42 CHONDROMALACIA PATELLAE OF LEFT KNEE: ICD-10-CM

## 2018-01-15 DIAGNOSIS — M25.562 CHRONIC PAIN OF LEFT KNEE: Primary | ICD-10-CM

## 2018-01-15 DIAGNOSIS — M25.561 CHRONIC PAIN OF RIGHT KNEE: ICD-10-CM

## 2018-01-15 DIAGNOSIS — M22.41 CHONDROMALACIA PATELLAE OF RIGHT KNEE: ICD-10-CM

## 2018-01-15 DIAGNOSIS — M17.11 PRIMARY OSTEOARTHRITIS OF RIGHT KNEE: ICD-10-CM

## 2018-01-15 DIAGNOSIS — G89.29 CHRONIC PAIN OF LEFT KNEE: Primary | ICD-10-CM

## 2018-01-15 DIAGNOSIS — Z98.890 S/P LEFT KNEE ARTHROSCOPY: ICD-10-CM

## 2018-01-15 DIAGNOSIS — G89.29 CHRONIC PAIN OF RIGHT KNEE: ICD-10-CM

## 2018-01-15 DIAGNOSIS — M17.12 PRIMARY OSTEOARTHRITIS OF LEFT KNEE: ICD-10-CM

## 2018-01-15 PROCEDURE — 99999 PR OFFICE/OUTPT VISIT,PROCEDURE ONLY: CPT | Performed by: ORTHOPAEDIC SURGERY

## 2018-01-15 PROCEDURE — 20610 DRAIN/INJ JOINT/BURSA W/O US: CPT | Performed by: ORTHOPAEDIC SURGERY

## 2018-01-15 NOTE — PATIENT INSTRUCTIONS
Walter Lr was instructed to apply ice to the injection area for 15 - 20 minutes several times a day to decrease pain and and swelling. Ice (\"ICE IS YOUR FRIEND\": try using a bag of frozen peas or corn) for 15  20 minutes 3 x day. Limit activities today. You may resume normal activities tomorrow if you have no pain. For severe pain:  If after hours, she is to go to Emergency Room. During office hours she must come in to the office. Walter Lr was instructed to call the office if there are any questions or concerns related to her condition. I have asked her to schedule a follow-up appointment with Dr. Edson Batista in the [place] office for 6-8 weeks from now for re-evaluation and possible repeat injection. She is specifically instructed to contact the office between now & her scheduled appointment if she has concerns related to her condition. She is welcome to call for an appointment sooner if she has any additional concerns or questions. General Medication Instructions:  Any prescriptions must be used as directed. If you have any concerns, questions or require refills, please contact our office. If you experience any adverse reactions, stop the medication and call our office immediately. If you designate a preferred pharmacy, appropriate prescriptions will be sent to your preferred pharmacy for pickup to be use as directed. Patient Driving Instructions:  No driving if you are taking narcotic pain medications or muscle relaxers. PATIENT REMINDER:   Carry a list of your medications and allergies with you at all times. Call your pharmacy and our office at least 1 week in advance to refill prescriptions. Narcotic medications will not be refilled after hours or on weekends. ATTENTION    As of October 2, 2014, the [facility] ([facility]) has mandated that ALL PRESCRIPTION PAIN MEDICINE cannot be called into your pharmacy.     This or someone you know struggles with weight issues and joint pain, please check out this important documentary:      \"Start Moving Start Living\" =  [email or web address]       ([employer] video SMS[employer] FULL SD)

## 2018-01-15 NOTE — LETTER
S/P right knee arthroscopy, chondroplasty, synovectomy, partial medial and lateral meniscectomy 12/21/15      Subjective:     Chief Complaint   Patient presents with    Knee Pain     ongoing evaluation and treatment of bilateral knees       HPI:  Riley Lennox is a 75y.o. year old,  female complaining of bilateral knee pain. She states that her knee pain is a 4 out of 10 pain with activity and an 8 out of 10 pain at rest.  She describes her pain as achy. Her knee pain is persistent and constant. Straightening her knee, kneeling, standing and stairs aggravate her knees. Her knee pain is limiting behavior. Ice and exercise to help. She does have night pain. She does have morning stiffness. Her knees are getting worse. She states she has some redness on the inside part of her knees. She would like injections in both knees today. PAIN ASSESSMENT:   Pain Assessment  Location of Pain: Knee  Location Modifiers: Right, Left  Severity of Pain: 8  Quality of Pain: Aching  Duration of Pain: Persistent  Frequency of Pain: Constant  Date Pain First Started:  (ongoing for years)  Aggravating Factors: Straightening, Kneeling, Standing, Stairs  Limiting Behavior: Yes  Relieving Factors: Ice, Exercise  Result of Injury: No  Work-Related Injury: No  Are there other pain locations you wish to document?: No    Patient Active Problem List   Diagnosis    Obesity (BMI 30.0-34. 9)    Primary osteoarthritis of right knee    Chondromalacia patellae of right knee    Primary osteoarthritis of left knee    Chondromalacia patellae of left knee    Status post fall 10/6/2015    Hamstring tightness of both lower extremities    Hypertension    Osteoporosis    Contact ulcer of vocal folds    S/P right knee arthroscopy, chondroplasty, synovectomy, partial medial and lateral meniscectomy 12/21/2015    S/P left knee arthroscopy, chondroplasty, synovectomy, partial lateral Assistive Device:  None   Knee Brace   Cane   Crutches    Walker    Wheelchair   Other     ORTHOPAEDIC KNEE EXAM:    RIGHT       LEFT       BILATERAL   Inspection:   Skin with slight redness along the medial aspect of medial femoral condylar region consistent with dry skin/cracking, cold weather and light clothing abrasion. No signs of infection   Ecchymosis:   none   mild   moderate   severe   Atrophy:   none   mild   moderate   severe   Effusion:  none   mild   moderate   severe   Scar / Surgical incision(s): A-Scope Portals   Open Surgical Incision(s)    Alignment:   Normal   Varus  Valgus    Range of Motion:   Normal Knee ROM          Deferred: acute injury/post-surgery/pain    Limited ROM:     Palpation:    No Tenderness   Tenderness:   mild   moderate   severe    Patellofemoral Crepitation:   none   mild   moderate   severe   Baker's Cyst:   none   small   medium   large   Rod's Sign:  negative   positive     Motor Function:    No gross motor weakness   Motor weakness:   mild   moderate   severe     Neurologic:   Sensation to light touch intact    Coordination / proprioception intact    Circulation:   The limb is warm and well perfused   Capillary refill is intact. Edema   none   mild   moderate   severe   Venous stasis changes   none   mild   moderate   severe    Bilateral Hip Exam:   Negative logroll    Data Reviewed:     No imaging studies were obtained today. PROCEDURE NOTE: BILATERAL KNEE INJECTIONS  1/15/2018 at 9:40 AM   Procedure: Injections  Verbal consent was obtained. Risks and benefits were explained. Questions were encouraged and answered.       Timeout Verification Completed including:    Correct patient: Yoko Yo was identified    Correct procedure    Correct site & side    Correct equipment and supplies    Staff member: Carolina Arellano MD     Assistant: Hailee Haddad     Injection Site: Superolateral bilaterally The injection sites were prepped with Chlora-Prep using aseptic technique and bilateral knee intra-articular injections were performed with ethyl chloride for anesthetic. Depomedrol 2 ml (40 mg/ml = 80 mg total) was injected into each knee. A sterile adhesive dressing was applied to each injection site. Post procedure:  Nikolai Rocha tolerated the treatment well. Instructions to patient:  Appropriate post injections instructions were given to Nikolai Rocha. Assessment (Medical Decision Making):     Nikolai Rocha is a 75y.o. year old female with the following diagnosis:    1. Chronic pain of left knee  IN ARTHROCENTESIS ASPIR&/INJ MAJOR JT/BURSA W/O US    IN METHYLPREDNISOLONE 40 MG INJ    Ambulatory referral to Orthopedic Surgery   2. Chondromalacia patellae of left knee  IN ARTHROCENTESIS ASPIR&/INJ MAJOR JT/BURSA W/O US    IN METHYLPREDNISOLONE 40 MG INJ    Ambulatory referral to Orthopedic Surgery   3. Primary osteoarthritis of left knee  IN ARTHROCENTESIS ASPIR&/INJ MAJOR JT/BURSA W/O US    IN METHYLPREDNISOLONE 40 MG INJ    Ambulatory referral to Orthopedic Surgery   4. S/P left knee arthroscopy, chondroplasty, synovectomy, partial lateral meniscectomy and removal of loose bodies 7/18/2016  Ambulatory referral to Orthopedic Surgery   5. Chronic pain of right knee  IN ARTHROCENTESIS ASPIR&/INJ MAJOR JT/BURSA W/O US    IN METHYLPREDNISOLONE 40 MG INJ    Ambulatory referral to Orthopedic Surgery   6. Primary osteoarthritis of right knee  IN ARTHROCENTESIS ASPIR&/INJ MAJOR JT/BURSA W/O US    IN METHYLPREDNISOLONE 40 MG INJ    Ambulatory referral to Orthopedic Surgery   7. Chondromalacia patellae of right knee  IN ARTHROCENTESIS ASPIR&/INJ MAJOR JT/BURSA W/O US    IN METHYLPREDNISOLONE 40 MG INJ    Ambulatory referral to Orthopedic Surgery   8.  S/P right knee arthroscopy, chondroplasty, synovectomy, partial medial and lateral meniscectomy 12/21/2015  Ambulatory referral to Orthopedic Surgery Her overall course:         Responding adequately to ongoing treatment      Worsening despite conservative treatment      Unchanged despite conservative treatment    Plan (Medical Decision Making):      I discussed the diagnosis and the treatment options with Riley Lennox today. In Summary:  1). The various treatment options were outlined and discussed with Riley Lennox including:       Conservative care options:      physical therapy, ice, NSAIDs, bracing, and activity modification        The indications for therapeutic injections Steroids and Hyluronic Acid/Synvisc/Euflexxa/Supartz     2). After considering the various options discussed, Riley Lennox elected to pursue a course of treatment that includes the following:       MEDICATIONS/REFILLS prescribed today are listed below. No refills today     Physical Therapy/HEP Activities as tolerated        Script: 2 x per week x 4 weeks     Ice to affected area: 15-20 minutes 4 x day     Injection into both of her knees today        I have informed Riley Lennox that I am planning to retire. I have assured her that appropriate follow-up care will be arranged. My official date for concluding my surgical practice was 12/31/2017, and the official half-way date is 3 months later to follow my postoperative patients for 90 days on 3/31/2018. I have referred her to Dr. Jennifer Connor in the [place] office. Return to Clinic/Follow - Up:  Riley Lennox was asked to make a follow-up appointment:     6-8 weeks if needed with Dr. Manuela Hernandez in the [place] office    Riley Lennox was instructed to call the office if her symptoms worsen or if new symptoms appear prior to the next scheduled visit. She is specifically instructed to contact the office between now & her scheduled appointment if she has concerns related to her condition or if she needs assistance in scheduling the above tests.  She is welcome to call for an As of October 2, 2014, the [facility]) has mandated that ALL PRESCRIPTION PAIN MEDICINE cannot be called into your pharmacy. This includes:    Oxycodone (Percocet)  Hydrocodone (Vicodin, Norco)  Tramadol (Ultram)  Other    These medications must have prescriptions which are written and signed by your doctor (Dr. Soanl Tellez). This means that you must call ahead and come in to the office to  the paper prescription and take it to your pharmacy. We are sorry for any inconvenience but this is now the law. Karyle Captain MD  Board Certified Orthopaedic Surgeon  Knee and Shoulder Surgery Specialist    Contact Information:  Sebastian Landry,   [phone], [address]         IMPORTANT NARCOTIC INFORMATION: PLEASE READ      DO NOT share your prescription medication with anyone! Sharing is illegal.  The prescription dose is based on your age, weight, and health problems. Sharing your narcotic prescription can lead to accidental death of the individual for which the prescription was not prescribed. You may not know about his/her addiction problem. Always use the same pharmacy when filling your narcotic prescriptions. DO NOT mix your narcotic prescription with alcohol. Mixing the narcotic prescription medication with alcohol causes depressive effects including breathing problems, organ malfunction, and cardiac arrest.      Always keep your narcotic medication in a locked, secured location. Keep your medication private. This is to avoid individuals from taking your medication without your knowledge. This medication is highly sought after and locking your prescriptions will protect you from being a target of crime. DO NOT stock pile your medication. This also will protect you from being a target of crime. Dispose of any unused medications properly. Do not flush the medications.   Look for appropriate waste collection programs in your community and drug take back events. DO NOT drive while taking narcotic pain medication or muscle relaxers. FOR MORE INFORMATION, CHECK OUT THIS RESOURCE    For your convenience, Dr. Divina Lou has provided the following link that may be helpful for you if you would like more information on your Orthopaedic condition:    www. Orthoinfo. org         If you or someone you know struggles with weight issues and joint pain, please check out this important documentary:      \"Start Moving Start Living\" =  Http://startmovingBaton Rouge Homes.[email or web address]       (YOUTUBE video SMSL FULL SD)             FALL PREVENTION:  SIMPLE TIPS    Vitamin D3:  Over-the-counter supplement of Vitamin D3, (at least 2,000 IU daily). Vitamin D3 is widely available without a prescription at pharmacies and buying clubs ([facility], [facility]) and on-line at sites such as Amazon.com. It comes in a variety of formulations (tablets, gelcaps, liquid) and doses (1,000 IU, 2,000 IU, 4,000 IU, 5,000 IU and even higher). The right dose for most people is 2,000 IU per day but higher doses are sometimes needed. You can take your vitamin D3 at any time of the day, with or without food, with or without calcium. Because vitamin D is long acting, if you miss your vitamin D on one day you can double up the dose on a later day. Exercise: Low impact exercise programs such as Mikel Chi. Chair Raise Exercise. Yoga. Adult fitness classes at the  or community center. Physical Therapy: Formal physical therapy program to strengthen your lower extremities, improve your balance and gait. Home Assessment: Remove clutter and tripping hazards: loose/throw rugs, cords or cables. Install or placement of railings, grab bars around steps/stairs and in the bathroom (toilet, bath tub, sink). Improve lighting. Foot Wear:  Stable, well fitting. Avoid loose straps or ties, slippery soles. Vision/Eye Check Up: Have your vision checked yearly.     Resources: www.cdc.gov/injury/STEADI    1). STEADI: Stay Independent Self Risk Assessment    2). CDC Handouts: How to prevent falls, Home Safety Fall Prevention         If you or someone you know struggles with weight issues and joint pain, please check out this important documentary:      \"Start Moving Start Living\" =  Http://[employer]erron       (Christy Poster video SMSL FULL SD)              Orders Placed This Encounter   Procedures    Ambulatory referral to Orthopedic Surgery     Referral Priority:   Routine     Referral Type:   Consult for Advice and Opinion     Referral Reason:   Specialty Services Required     Referred to Provider:   Ender Lyman MD     Requested Specialty:   Orthopedic Surgery     Number of Visits Requested:   1    MA ARTHROCENTESIS ASPIR&/INJ MAJOR JT/BURSA W/O US    MA METHYLPREDNISOLONE 40 MG INJ    MA ARTHROCENTESIS ASPIR&/INJ MAJOR JT/BURSA W/O US    MA METHYLPREDNISOLONE 40 MG INJ       Refills/New Prescriptions:  No orders of the defined types were placed in this encounter. Today's prescription medications will be e-scribed (when appropriate) to the Patient's Preferred Pharmacy:   [facility] [address], [address]  [address] [facility] [facility] [address] [address]  Phone: [phone] Fax: [phone]         Karyle Captain MD  Board Certified Orthopaedic Surgeon  Knee and Shoulder Surgery Specialist  Electronically signed by Karyle Captain MD on 1/15/2018 at 9:40 AM     Contact Information:  Sebastian Landry,   [phone], [address]    This dictation was performed with a verbal recognition program [facility]) and it was checked for errors. It is possible that there are still dictated errors within this office note. If so, please bring any errors to my attention for an addendum. All efforts were made to ensure that this office note is accurate. I have personally performed and/or participated in the history, physical exam and medical decision making and reviewed all pertinent clinical information unless otherwise noted.

## 2018-01-15 NOTE — PROGRESS NOTES
Lorenza Michael MD  Orthopaedic Surgery & Sports Medicine  Knee & Shoulder Specialist      [x] [facility] OFFICE   [] [place] [facility] [place] [facility]  [address]  [address] [facility]    [place], [address] [facility]          [place], [address]    [phone] [address]   [phone] Option [phone] (fax)    [phone] (fax)       PATIENT: Zack Buenrostro    75 y.o.  female  [facility]: 1945   MRN:  [id number]       Date of current encounter: 1/15/2018  This encounter is evaluated as a:       [] New Patient Visit    [x] Established Patient Visit   [] Post-Op Visit     [] Consult: requested by         [] Worker's Comp       Patient's PCP is Dr. Lenin Prasad DO       SURGICAL FINDINGS: S/P left knee arthroscopy, chondroplasty, synovectomy, partial lateral meniscectomy and removal of loose bodies 7/18/16       S/P right knee arthroscopy, chondroplasty, synovectomy, partial medial and lateral meniscectomy 12/21/15      Subjective:     Chief Complaint   Patient presents with    Knee Pain     ongoing evaluation and treatment of bilateral knees       HPI:  Zack Buenrostro is a 75y.o. year old,  female complaining of bilateral knee pain. She states that her knee pain is a 4 out of 10 pain with activity and an 8 out of 10 pain at rest.  She describes her pain as achy. Her knee pain is persistent and constant. Straightening her knee, kneeling, standing and stairs aggravate her knees. Her knee pain is limiting behavior. Ice and exercise to help. She does have night pain. She does have morning stiffness. Her knees are getting worse. She states she has some redness on the inside part of her knees. She would like injections in both knees today.     PAIN ASSESSMENT:   Pain Assessment  Location of Pain: Knee  Location Modifiers: Right, Left  Severity of Pain: 8  Quality of Pain: Aching  Duration of Pain: Persistent  Frequency of Pain: Constant  Date Pain First Started:  (ongoing for years)  Aggravating Factors: Straightening, Kneeling, Standing, Stairs  Limiting Behavior: Yes  Relieving Factors: Ice, Exercise  Result of Injury: No  Work-Related Injury: No  Are there other pain locations you wish to document?: No    Patient Active Problem List   Diagnosis    Obesity (BMI 30.0-34. 9)    Primary osteoarthritis of right knee    Chondromalacia patellae of right knee    Primary osteoarthritis of left knee    Chondromalacia patellae of left knee    Status post fall 10/6/2015    Hamstring tightness of both lower extremities    Hypertension    Osteoporosis    Contact ulcer of vocal folds    S/P right knee arthroscopy, chondroplasty, synovectomy, partial medial and lateral meniscectomy 12/21/2015    S/P left knee arthroscopy, chondroplasty, synovectomy, partial lateral meniscectomy and removal of loose bodies 7/18/2016    Acute deep vein thrombosis (DVT) of distal end of left lower extremity ([address]    History of right shoulder surgery by Dr. Sin in 2009    Neck pain    Chronic right shoulder pain    Neck arthritis ([address] C5C6-C7    Chronic pain of left knee    Chronic pain of right knee    S/P Right rotator cuff repair, arthroscopy, debridement and Biceps tenotomy 7/24/2017     Past Medical History:   Diagnosis Date    Arthritis     Connective tissue anomaly BEHSUTS DX    Hx of blood clots     left leg after knee surgery    Hypertension     no medications currently    Knee pain     LPRD (laryngopharyngeal reflux disease)     Rotator cuff injury     Vascular disease      Past Surgical History:   Procedure Laterality Date    CATARACT REMOVAL WITH IMPLANT Bilateral     FOOT NEUROMA SURGERY      KNEE ARTHROSCOPY  07/18/2016    left    KNEE SURGERY Right 12 21 15    Arthroscopy, Chondroplasty,Synovectomy,, medial meniectomy    SHOULDER ARTHROSCOPY Right 2009    by Dr. Brian Rodrigues ARTHROSCOPY Left     SHOULDER SURGERY  07/24/2016    RIGHT SHOULDER ARTHROSCOPY WITH DEBRIDEMENT, ROTATOR CUFF       Allergies:  Bacitracin; Doxycycline; Nitrofuran derivatives; Amoxicillin; Ampicillin; Betadine  [povidone iodine]; Penicillins; and Percocet [oxycodone-acetaminophen]    Medications:  Prior to Visit Medications    Medication Sig Taking? Authorizing Provider   HYDROcodone-acetaminophen (NORCO) 5-325 MG per tablet Take 1 tablet by mouth every 6 hours as needed for Pain . Earliest Fill Date: 8/3/17  [facility]   meloxicam (MOBIC) 15 MG tablet Take 1 tablet by mouth daily Take one tab 15 mg by mouth daily with food  Francisca Hamlin MD   Ca Phosphate-Cholecalciferol 200-200 MG-UNIT CHEW Take by mouth  Historical Provider, MD   Multiple Vitamins-Minerals (MULTIVITAMIN & MINERAL PO) Take 1 tablet by mouth daily   Historical Provider, MD   omeprazole (PRILOSEC) 20 MG capsule Take 20 mg by mouth daily. Historical Provider, MD     Social History     Social History    Marital status: Single     Spouse name: N/A    Number of children: N/A    Years of education: N/A     Occupational History    Not on file. Social History Main Topics    Smoking status: Never Smoker    Smokeless tobacco: Never Used    Alcohol use 0.6 oz/week     1 Glasses of wine per week      Comment: wine daily    Drug use: No    Sexual activity: Not on file     Other Topics Concern    Not on file     Social History Narrative    No narrative on file     No family history on file. Review of Systems (ROS):    [x]Performed. Cielo Elliott's review of systems has been performed (Musculoskeletal, Integumentary, CardioPulmonary, Neurological focused) by intake and observation. All past and current ROS forms have been scanned into the medical record. She has been instructed to contact her primary care physician regarding ROS issues if not already being addressed at this time. There are no recent changes. The most recent ROS was scanned into media on 5/4/2017.     Objective:   Physical Exam  Vital [] moderate  [] severe     Neurologic:  [x] Sensation to light touch intact   [x] Coordination / proprioception intact    Circulation:  [x] The limb is warm and well perfused  [x] Capillary refill is intact. [x] Edema  [x] none  [] mild  [] moderate  [] severe  [x] Venous stasis changes  [x] none  [] mild  [] moderate  [] severe    Bilateral Hip Exam:  [x] Negative logroll    Data Reviewed:     No imaging studies were obtained today. PROCEDURE NOTE: BILATERAL KNEE INJECTIONS  1/15/2018 at 9:40 AM   Procedure: Injections  Verbal consent was obtained. Risks and benefits were explained. Questions were encouraged and answered. Timeout Verification Completed including:    Correct patient: Colby Buckner was identified    Correct procedure    Correct site & side    Correct equipment and supplies    Staff member: Karyle Captain MD     Assistant: Charmaine Reyes     Injection Site: Superolateral bilaterally    The injection sites were prepped with Chlora-Prep using aseptic technique and bilateral knee intra-articular injections were performed with ethyl chloride for anesthetic. Depomedrol 2 ml (40 mg/ml = 80 mg total) was injected into each knee. A sterile adhesive dressing was applied to each injection site. Post procedure:  Colby Buckner tolerated the treatment well. Instructions to patient:  Appropriate post injections instructions were given to Colby Buckner. Assessment (Medical Decision Making):     Colby Buckner is a 75y.o. year old female with the following diagnosis:    1. Chronic pain of left knee  TX ARTHROCENTESIS ASPIR&/INJ MAJOR JT/BURSA W/O US    TX METHYLPREDNISOLONE 40 MG INJ    Ambulatory referral to Orthopedic Surgery   2. Chondromalacia patellae of left knee  TX ARTHROCENTESIS ASPIR&/INJ MAJOR JT/BURSA W/O US    TX METHYLPREDNISOLONE 40 MG INJ    Ambulatory referral to Orthopedic Surgery   3.  Primary osteoarthritis of left knee  TX ARTHROCENTESIS ASPIR&/INJ MAJOR into both of her knees today      [x]  I have informed Jairon Sykes that I am planning to retire. I have assured her that appropriate follow-up care will be arranged. My official date for concluding my surgical practice was 12/31/2017, and the official assisted date is 3 months later to follow my postoperative patients for 90 days on 3/31/2018. [x]  I have referred her to Dr. Patricio Johnson in the [place] office. Return to Clinic/Follow - Up:  Jairon Sykes was asked to make a follow-up appointment:    [x] 6-8 weeks if needed with Dr. Angi Moreno in the [place] office    Jairon Sykes was instructed to call the office if her symptoms worsen or if new symptoms appear prior to the next scheduled visit. She is specifically instructed to contact the office between now & her scheduled appointment if she has concerns related to her condition or if she needs assistance in scheduling the above tests. She is welcome to call for an appointment sooner if she has any additional concerns or questions. Patient Education Materials Provided:    Patient Instructions     Jairon Sykes was instructed to apply ice to the injection area for 15 - 20 minutes several times a day to decrease pain and and swelling. Ice (\"ICE IS YOUR FRIEND\": try using a bag of frozen peas or corn) for 15  20 minutes 3 x day. Limit activities today. You may resume normal activities tomorrow if you have no pain. For severe pain:  If after hours, she is to go to Emergency Room. During office hours she must come in to the office. Jairon Sykes was instructed to call the office if there are any questions or concerns related to her condition. I have asked her to schedule a follow-up appointment with Dr. Angi Moreno in the [place] office for 6-8 weeks from now for re-evaluation and possible repeat injection.     She is specifically instructed to contact the office between now & her scheduled

## 2018-04-26 NOTE — FLOWSHEET NOTE
Left message confirming date, time and location of upcoming appointment.    Maik Mead LPN     [facility] 1# 3 10 Start 10/3                        Neuromuscular Re-ed / Therapeutic Activities                                                        Manual Intervention       Shld /GH Mobs       Post Cap mobs       Thoracic/Rib manipualtion       CT MT/Mobs       PROM MT Scap, flexion  rotation 10'              Patient Education:      Therapeutic Exercise and NMR EXR  [x] (52517) Provided verbal/tactile cueing for activities related to strengthening, flexibility, endurance, ROM  for improvements in scapular, scapulothoracic and UE control with self care, reaching, carrying, lifting, house/yardwork, driving/computer work.    [] (66483) Provided verbal/tactile cueing for activities related to improving balance, coordination, kinesthetic sense, posture, motor skill, proprioception  to assist with  scapular, scapulothoracic and UE control with self care, reaching, carrying, lifting, house/yardwork, driving/computer work. Therapeutic Activities:    [] (80694 or 29518) Provided verbal/tactile cueing for activities related to improving balance, coordination, kinesthetic sense, posture, motor skill, proprioception and motor activation to allow for proper function of scapular, scapulothoracic and UE control with self care, carrying, lifting, driving/computer work.      Home Exercise Program:    [x] (17605) Reviewed/Progressed HEP activities related to strengthening, flexibility, endurance, ROM of scapular, scapulothoracic and UE control with self care, reaching, carrying, lifting, house/yardwork, driving/computer work  [] (42005) Reviewed/Progressed HEP activities related to improving balance, coordination, kinesthetic sense, posture, motor skill, proprioception of scapular, scapulothoracic and UE control with self care, reaching, carrying, lifting, house/yardwork, driving/computer work      Manual Treatments:  PROM / STM / Oscillations-Mobs:  G-I, II, III, IV (PA's, Inf., Post.)  [x] (16969) Provided manual DPT

## 2018-06-13 ENCOUNTER — HOSPITAL ENCOUNTER (OUTPATIENT)
Dept: PHYSICAL THERAPY | Age: 73
Discharge: HOME | End: 2018-06-30
Admitting: ORTHOPAEDIC SURGERY

## 2018-06-13 NOTE — FLOWSHEET NOTE
[facility]  Orthopaedics and Sports Rehabilitation, [place]    Physical Therapy Daily Treatment Note  Date:  2018    Patient Name:  Charis Barney    :  1945  MRN: [id number]  Medical/Treatment Diagnosis Information:  Diagnosis: M70.61, M70.62 (ICD-10-CM) - Trochanteric bursitis of both hips  Treatment Diagnosis: M25.651, M25.652 B hip stiffness  Insurance/Certification information:  PT Insurance Information: Medicare  Physician Information:  Referring Practitioner: Dr. Shana Hendrickson of care signed (Y/N):     Date of Patient follow up with Physician:     G-Code (if applicable):      Date G-Code Applied:    PT G-Codes  Functional Assessment Tool Used: LEFS  Score: 79% limitation  Functional Limitation: Mobility: Walking and moving around  Mobility: Walking and Moving Around Current Status (): At least 60 percent but less than 80 percent impaired, limited or restricted  Mobility: Walking and Moving Around Goal Status (): At least 20 percent but less than 40 percent impaired, limited or restricted    Progress Note: [x]  Yes  []  No  Next due by: Visit #10       Latex Allergy:  [x]NO      []YES  Preferred Language for Healthcare:   [x]English       []other:    Visit # Insurance Allowable   1 Medicare Guidelines     Pain level:  0-6/10     SUBJECTIVE:  See eval    OBJECTIVE: See eval      RESTRICTIONS/PRECAUTIONS: hx of knee OA and LBP    Exercises/Interventions:     All exercises performed bilaterally  Therapeutic Exercises  Resistance / level Sets/sec Reps Notes   Seated HS stretch  30\" 3    Supine ITB stretch  30\" 3    Hip flexor stretch    Attempted standing, supine and prone but unable to perform due to back or knee pain          bridging  10\" 10    clams  3 10    Prone hip extension SLR npv                                  Neuromuscular Re-ed / Therapeutic Activities                            Manual Intervention       Hip PROM/mobs npv      STM B hip flexor tendon, ITB npv restriction. 5. Patient will return to walking for exercise without increased symptoms or restriction          Progression Towards Functional goals:  [] Patient is progressing as expected towards functional goals listed. [] Progression is slowed due to complexities listed. [] Progression has been slowed due to co-morbidities.   [x] Plan just implemented, too soon to assess goals progression  [] Other:     Persisting Functional Limitations/Impairments:  [x]Sitting [x]Standing   [x]Walking [x]Squatting/bending    [x]Stairs [x]ADL's    [x]Transfers []Reaching  []Housework [x]Job related tasks  []Driving [x]Sports/Recreation   []Other:    ASSESSMENT:  See eval  Treatment/Activity Tolerance:  [x] Patient tolerated treatment well [] Patient limited by fatique  [] Patient limited by pain  [] Patient limited by other medical complications  [] Other:     Prognosis: [x] Good [x] Fair  [] Poor    Patient Requires Follow-up: [x] Yes  [] No    Return to Play:    [x]  N/A   []  Stage 1: Intro to Strength   []  Stage 2: Return to Run and Strength   []  Stage 3: Return to Jump and Strength   []  Stage 4: Dynamic Strength and Agility   []  Stage 5: Sport Specific Training     []  Ready to Return to Play, Meets All Above Stages   []  Not Ready for Return to Sports   Comments:            PLAN: See eval  [] Continue per plan of care [] Alter current plan (see comments)  [x] Plan of care initiated [] Hold pending MD visit [] Discharge    Electronically signed by: Marii Jimenez PT, DPT

## 2018-06-13 NOTE — PLAN OF CARE
tasks   [x]Reduced ability to squat   [x]Reduced ability to forward bend   [x]Reduced ability to ambulate prolonged functional periods/distances/surfaces   [x]Reduced ability to ascend/descend stairs   [x]Reduced ability to run, hop, cut or jump   []other:    Participation Restrictions   [x]Reduced participation in self care activities   [x]Reduced participation in home management activities   [x]Reduced participation in work activities   [x]Reduced participation in social activities. [x]Reduced participation in sport/recreation activities. Classification :    []Signs/symptoms consistent with post-surgical status including decreased ROM, strength and function.    []Signs/symptoms consistent with joint sprain/strain   []Signs/symptoms consistent with patella-femoral syndrome   []Signs/symptoms consistent with knee OA/hip OA   []Signs/symptoms consistent with internal derangement of knee/Hip   []Signs/symptoms consistent with functional hip weakness/NMR control      [x]Signs/symptoms consistent with tendinitis/tendinosis    []signs/symptoms consistent with pathology which may benefit from Dry needling      []other:      Prognosis/Rehab Potential:      []Excellent   [x]Good    [x]Fair   []Poor    Tolerance of evaluation/treatment:    []Excellent   [x]Good    []Fair   []Poor    Physical Therapy Evaluation Complexity Justification  [x] A history of present problem with:  [] no personal factors and/or comorbidities that impact the plan of care;  []1-2 personal factors and/or comorbidities that impact the plan of care  [x]3 personal factors and/or comorbidities that impact the plan of care  [x] An examination of body systems using standardized tests and measures addressing any of the following: body structures and functions (impairments), activity limitations, and/or participation restrictions;:  [] a total of 1-2 or more elements   [] a total of 3 or more elements   [x] a total of 4 or more elements   [x] A clinical presentation with:  [x] stable and/or uncomplicated characteristics   [] evolving clinical presentation with changing characteristics  [] unstable and unpredictable characteristics;   [x] Clinical decision making of [x] low, [] moderate, [] high complexity using standardized patient assessment instrument and/or measurable assessment of functional outcome. [x] EVAL (LOW) 69578 (typically 15 minutes face-to-face)  [] EVAL (MOD) 95667 (typically 30 minutes face-to-face)  [] EVAL (HIGH) 66817 (typically 45 minutes face-to-face)  [] RE-EVAL     PLAN:   Frequency/Duration:  1-2 days per week for 6-8 Weeks:  Interventions:  [x]  Therapeutic exercise including: strength training, ROM, for Lower extremity and core   [x]  NMR activation and proprioception for LE, Glutes and Core   [x]  Manual therapy as indicated for LE, Hip and spine to include: Dry Needling/IASTM, STM, PROM, Gr I-IV mobilizations, manipulation. [x] Modalities as needed that may include: thermal agents, E-stim, Biofeedback, US, iontophoresis as indicated  [x] Patient education on joint protection, postural re-education, activity modification, progression of HEP. HEP instruction: Pt. Provided with written and illustrated instructions for home program. Pt to perform exercises 1-2x day f/b ice 15 min. (see scanned forms)    GOALS:  Patient stated goal: return to walking for distance    Therapist goals for Patient:   Short Term Goals: To be achieved in: 2 weeks  1. Independent in HEP and progression per patient tolerance, in order to prevent re-injury. 2. Patient will have a decrease in pain to facilitate improvement in movement, function, and ADLs as indicated by Functional Deficits. Long Term Goals: To be achieved in: 6-8 weeks  1. Disability index score of 30% or less for the LEFS to assist with reaching prior level of function.    2. Patient will demonstrate increased LE flexibility to max potential to allow for proper joint functioning as

## 2018-06-21 ENCOUNTER — HOSPITAL ENCOUNTER (OUTPATIENT)
Dept: PHYSICAL THERAPY | Age: 73
Discharge: HOME | End: 2018-06-22
Admitting: ORTHOPAEDIC SURGERY

## 2018-06-26 ENCOUNTER — HOSPITAL ENCOUNTER (OUTPATIENT)
Dept: PHYSICAL THERAPY | Age: 73
Discharge: HOME | End: 2018-06-27
Admitting: ORTHOPAEDIC SURGERY

## 2018-07-01 ENCOUNTER — HOSPITAL ENCOUNTER (OUTPATIENT)
Dept: PHYSICAL THERAPY | Age: 73
Discharge: HOME | End: 2018-07-31
Attending: ORTHOPAEDIC SURGERY | Admitting: ORTHOPAEDIC SURGERY

## 2018-07-03 ENCOUNTER — HOSPITAL ENCOUNTER (OUTPATIENT)
Dept: PHYSICAL THERAPY | Age: 73
Discharge: HOME | End: 2018-07-04
Admitting: ORTHOPAEDIC SURGERY

## 2018-07-03 NOTE — FLOWSHEET NOTE
10\" 10    Clams   2 10 pillow between knees  7/3 limited in sets due to pain   Prone hip extension SLR  3 10 Start 6/21                               Neuromuscular Re-ed / Therapeutic Activities                            Manual Intervention       Hip PROM/mobs B hip belt mobs  GIII/IV lateral, inferior 12'                                   Pt. Education  - reviewed optimal stretching (3 on a scale of 0-10)      Therapeutic Exercise and NMR EXR  [x] (11587) Provided verbal/tactile cueing for activities related to strengthening, flexibility, endurance, ROM for improvements in LE, proximal hip, and core control with self care, mobility, lifting, ambulation.  [] (75517) Provided verbal/tactile cueing for activities related to improving balance, coordination, kinesthetic sense, posture, motor skill, proprioception  to assist with LE, proximal hip, and core control in self care, mobility, lifting, ambulation and eccentric single leg control.      NMR and Therapeutic Activities:    [x] (28072 or 23139) Provided verbal/tactile cueing for activities related to improving balance, coordination, kinesthetic sense, posture, motor skill, proprioception and motor activation to allow for proper function of core, proximal hip and LE with self care and ADLs  [] (12100) Gait Re-education- Provided training and instruction to the patient for proper LE, core and proximal hip recruitment and positioning and eccentric body weight control with ambulation re-education including up and down stairs     Home Exercise Program:    [x] (50042) Reviewed/Progressed HEP activities related to strengthening, flexibility, endurance, ROM of core, proximal hip and LE for functional self-care, mobility, lifting and ambulation/stair navigation   [] (24939)Reviewed/Progressed HEP activities related to improving balance, coordination, kinesthetic sense, posture, motor skill, proprioception of core, proximal hip and LE for self care, mobility, lifting, and an increase in Strength to at least 4+/5 throughout as well as good proximal hip strength and control to allow for proper functional mobility as indicated by patients Functional Deficits. 4. Patient will return to functional activities including sleeping and stairs without increased symptoms or restriction. 5. Patient will return to walking for exercise without increased symptoms or restriction          Progression Towards Functional goals:  [] Patient is progressing as expected towards functional goals listed. [] Progression is slowed due to complexities listed. [] Progression has been slowed due to co-morbidities. [x] Plan just implemented, too soon to assess goals progression  [] Other:     Persisting Functional Limitations/Impairments:  [x]Sitting [x]Standing   [x]Walking [x]Squatting/bending    [x]Stairs [x]ADL's    [x]Transfers []Reaching  []Housework [x]Job related tasks  []Driving [x]Sports/Recreation   []Other:    ASSESSMENT:      Treatment/Activity Tolerance:  [x] Patient tolerated treatment fair to well [] Patient limited by fatique  [] Patient limited by pain  [] Patient limited by other medical complications  [x] Other: Pt. Tolerated therapy today without complaints. Pt. Able to complete stretching activities with slight modifications necessary. Limited repetitions with clams today due to pain. Pt. Required cueing for level pelvis with prone SLR. Pt. Noted complete pain relief following manual treatment today. Pt. Will continue to benefit from skilled therapy in order to decrease hip pain with daily activities.      Prognosis: [x] Good [x] Fair  [] Poor    Patient Requires Follow-up: [x] Yes  [] No    Return to Play:    [x]  N/A   []  Stage 1: Intro to Strength   []  Stage 2: Return to Run and Strength   []  Stage 3: Return to Jump and Strength   []  Stage 4: Dynamic Strength and Agility   []  Stage 5: Sport Specific Training     []  Ready to Return to Play, Meets All Above Stages   []  Not

## 2018-07-17 ENCOUNTER — HOSPITAL ENCOUNTER (OUTPATIENT)
Dept: PHYSICAL THERAPY | Age: 73
Discharge: HOME | End: 2018-07-18
Admitting: ORTHOPAEDIC SURGERY

## 2018-07-17 NOTE — FLOWSHEET NOTE
[address] [place] [address] and [facility] [place], [place]    Physical Therapy Daily Treatment Note  Date:  2018    Patient Name:  Salvatore Larson    :  1945  MRN: [id number]  Medical/Treatment Diagnosis Information:  Diagnosis: M70.61, M70.62 (ICD-10-CM) - Trochanteric bursitis of both hips  Treatment Diagnosis: M25.651, M25.652 B hip stiffness  Insurance/Certification information:  PT Insurance Information: Medicare  Physician Information:  Referring Practitioner: Dr. Rigoberto Duffy of care signed (Y/N): Y    Date of Patient follow up with Physician: 18    G-Code (if applicable):      Date G-Code Applied: 18         Progress Note: []  Yes  [x]  No  Next due by: Visit #10       Latex Allergy:  [x]NO      []YES  Preferred Language for Healthcare:   [x]English       []other:    Visit # Insurance Allowable   6 Medicare Guidelines     Pain level:  3/10 R; 3/10 L (lateral hips)    SUBJECTIVE: Pt. Reports that she has a little pain in bilateral lateral hips. Pt. Notes that overall has been feeling good with minimal pain. Pt. States that she has been walking more but has been unable to complete HEP due to traveling. OBJECTIVE:    Palpation: tenderness throughout full ITB bilaterally      RESTRICTIONS/PRECAUTIONS: hx of knee OA and LBP    Exercises/Interventions:     All exercises performed bilaterally  Therapeutic Exercises  Resistance / level Sets/sec Reps Notes   Seated HS stretch  30\" 3    Supine ITB stretch  30\" 3  - modified:  R: knee bent, pull across with L hand  L: knee bent, pull across with R hand   Hip flexor stretch - Cedric position  30\" 3 Start   Achieved optimal stretch but laying in Tommy Bares position with opposite knee flexed, but not pulling opposite knee to chest.           Bridging   10\" 10    Clams  2# 3 10 ^, pillow between knees   Prone hip extension SLR 2# 3 10 ^                               Neuromuscular Re-ed / Therapeutic Activities                            Manual Intervention           STM B proximal ITB, glute insertion  12'                                    Pt. Education  - reviewed optimal stretching (3 on a scale of 0-10)      Therapeutic Exercise and NMR EXR  [x] (97355) Provided verbal/tactile cueing for activities related to strengthening, flexibility, endurance, ROM for improvements in LE, proximal hip, and core control with self care, mobility, lifting, ambulation.  [] (61801) Provided verbal/tactile cueing for activities related to improving balance, coordination, kinesthetic sense, posture, motor skill, proprioception  to assist with LE, proximal hip, and core control in self care, mobility, lifting, ambulation and eccentric single leg control.      NMR and Therapeutic Activities:    [x] (51093 or 27354) Provided verbal/tactile cueing for activities related to improving balance, coordination, kinesthetic sense, posture, motor skill, proprioception and motor activation to allow for proper function of core, proximal hip and LE with self care and ADLs  [] (34662) Gait Re-education- Provided training and instruction to the patient for proper LE, core and proximal hip recruitment and positioning and eccentric body weight control with ambulation re-education including up and down stairs     Home Exercise Program:    [x] (66829) Reviewed/Progressed HEP activities related to strengthening, flexibility, endurance, ROM of core, proximal hip and LE for functional self-care, mobility, lifting and ambulation/stair navigation   [] (66466)Reviewed/Progressed HEP activities related to improving balance, coordination, kinesthetic sense, posture, motor skill, proprioception of core, proximal hip and LE for self care, mobility, lifting, and ambulation/stair navigation      Manual Treatments:  PROM / STM / Oscillations-Mobs:  G-I, II, III, IV (PA's, Inf., Post.)  [x] (84442) Provided manual therapy to mobilize LE, proximal hip and/or LS spine soft tissue/joints for the purpose of modulating pain, promoting relaxation,  increasing ROM, reducing/eliminating soft tissue swelling/inflammation/restriction, improving soft tissue extensibility and allowing for proper ROM for normal function with self care, mobility, lifting and ambulation. Modalities:  [] (12498) Vasopneumatic compression: Utilized vasopneumatic compression to decrease edema / swelling for the purpose of improving mobility and quad tone / recruitment which will allow for increased overall function including but not limited to self-care, transfers, ambulation, and ascending / descending stairs. Modalities:    Ice - declined    Charges:  Timed Code Treatment Minutes: 28   Total Treatment Minutes: 50     [] EVAL - LOW (13303)   [] EVAL - MOD (19401)  [] EVAL - HIGH (99289)  [] RE-EVAL (58280)  [x] XH(35224) x  1   [] IONTO  [] NMR (01114) x      [] VASO  [x] Manual (44855) x  1    [x] Other: group  [] TA x       [] Mech Traction (53460)  [] ES(attended) (24493)      [] ES (un) (08192):     GOALS:  Patient stated goal: return to walking for distance     Therapist goals for Patient:   Short Term Goals: To be achieved in: 2 weeks  1. Independent in HEP and progression per patient tolerance, in order to prevent re-injury. 2. Patient will have a decrease in pain to facilitate improvement in movement, function, and ADLs as indicated by Functional Deficits.     Long Term Goals: To be achieved in: 6-8 weeks  1. Disability index score of 30% or less for the LEFS to assist with reaching prior level of function. 2. Patient will demonstrate increased LE flexibility to max potential to allow for proper joint functioning as indicated by patients Functional Deficits. 3. Patient will demonstrate an increase in Strength to at least 4+/5 throughout as well as good proximal hip strength and control to allow for proper functional mobility as indicated by patients Functional Deficits.    4. Patient will return to functional activities including sleeping and stairs without increased symptoms or restriction. 5. Patient will return to walking for exercise without increased symptoms or restriction          Progression Towards Functional goals:  [] Patient is progressing as expected towards functional goals listed. [] Progression is slowed due to complexities listed. [] Progression has been slowed due to co-morbidities. [x] Plan just implemented, too soon to assess goals progression  [] Other:     Persisting Functional Limitations/Impairments:  [x]Sitting [x]Standing   [x]Walking [x]Squatting/bending    [x]Stairs [x]ADL's    [x]Transfers []Reaching  []Housework [x]Job related tasks  []Driving [x]Sports/Recreation   []Other:    ASSESSMENT:      Treatment/Activity Tolerance:  [x] Patient tolerated treatment well [] Patient limited by fatique  [] Patient limited by pain  [] Patient limited by other medical complications  [x] Other: pt. Tolerated therapy today with minimal complaints. Attempted to add BS with bridging but pt. Noted increased cramping and unable to complete. Pt. Improving in strength demonstrated by ability to increase resistance with clams and prone SLR. Pt. Demonstrates good technique with stretching exercises. Pt. Will continue to benefit from skilled therapy in order to improve strength and decrease symptoms in bilateral hips with daily activities. Prognosis: [x] Good [x] Fair  [] Poor    Patient Requires Follow-up: [x] Yes  [] No    Return to Play:    [x]  N/A   []  Stage 1: Intro to Strength   []  Stage 2: Return to Run and Strength   []  Stage 3: Return to Jump and Strength   []  Stage 4: Dynamic Strength and Agility   []  Stage 5: Sport Specific Training     []  Ready to Return to Play, Meets All Above Stages   []  Not Ready for Return to Sports   Comments:            PLAN: continue to progress as tolerated.    [x] Continue per plan of care [] Alter current plan (see

## 2018-07-24 ENCOUNTER — HOSPITAL ENCOUNTER (OUTPATIENT)
Dept: PHYSICAL THERAPY | Age: 73
Discharge: HOME | End: 2018-07-25
Admitting: ORTHOPAEDIC SURGERY

## 2018-07-24 NOTE — FLOWSHEET NOTE
Strength and Agility   []  Stage 5: Sport Specific Training     []  Ready to Return to Play, Meets All Above Stages   []  Not Ready for Return to Sports   Comments:            PLAN: continue to progress as tolerated.    [x] Continue per plan of care [] Alter current plan (see comments)  [] Plan of care initiated [] Hold pending MD visit [] Discharge    Electronically signed by: Marii Jimenez PT, DPT

## 2018-07-26 ENCOUNTER — HOSPITAL ENCOUNTER (OUTPATIENT)
Dept: PHYSICAL THERAPY | Age: 73
Discharge: HOME | End: 2018-07-27
Admitting: ORTHOPAEDIC SURGERY

## 2018-07-26 NOTE — FLOWSHEET NOTE
[facility]  Orthopaedics and Sports Rehabilitation, [place]    Physical Therapy Daily Treatment Note  Date:  2018    Patient Name:  Juan Pablo Greene    :  1945  MRN: [id number]  Medical/Treatment Diagnosis Information:  Diagnosis: M70.61, M70.62 (ICD-10-CM) - Trochanteric bursitis of both hips  Treatment Diagnosis: M25.651, M25.652 B hip stiffness  Insurance/Certification information:  PT Insurance Information: Medicare  Physician Information:  Referring Practitioner: Dr. Melo Olvera of care signed (Y/N): Y    Date of Patient follow up with Physician:     G-Code (if applicable):      Date G-Code Applied: 18         Progress Note: []  Yes  [x]  No  Next due by: Visit #10       Latex Allergy:  [x]NO      []YES  Preferred Language for Healthcare:   [x]English       []other:    Visit # Insurance Allowable   8 Medicare Guidelines     Pain level:  4/10 R lateral hip; 0/10 L    SUBJECTIVE: Pt. Reports that her L hip is feeling better and she not have pain on L side today. Pt. Notes that she continues to have a little anterior and lateral hip pain on the R but this is in a smaller area. Pt. Reports compliance with stretching exercises. OBJECTIVE:    Palpation: tenderness throughout proximal 3rd ITB bilaterally   Mild effusion B trochanteric bursa      RESTRICTIONS/PRECAUTIONS: hx of knee OA and LBP    Exercises/Interventions:     All exercises performed bilaterally  Therapeutic Exercises  Resistance / level Sets/sec Reps Notes   Seated HS stretch  30\" 3    Supine ITB stretch  30\" 3  - modified:  R: knee bent, pull across with L hand  L: knee bent, pull across with R hand   Hip flexor stretch - Cedric position  30\" 3 Start   Achieved optimal stretch but laying in [place] position with opposite knee flexed, but not pulling opposite knee to chest.           Bridging  W/ BS 10\" 10    Clams - hooklying black 3 10 Modified    Prone hip extension SLR 2# 3 10  (PA's, Inf., Post.)  [x] (68565) Provided manual therapy to mobilize LE, proximal hip and/or LS spine soft tissue/joints for the purpose of modulating pain, promoting relaxation,  increasing ROM, reducing/eliminating soft tissue swelling/inflammation/restriction, improving soft tissue extensibility and allowing for proper ROM for normal function with self care, mobility, lifting and ambulation. Modalities:  [] (65283) Vasopneumatic compression: Utilized vasopneumatic compression to decrease edema / swelling for the purpose of improving mobility and quad tone / recruitment which will allow for increased overall function including but not limited to self-care, transfers, ambulation, and ascending / descending stairs. Modalities:    Ice - declined    Charges:  Timed Code Treatment Minutes: 32   Total Treatment Minutes: 46     [] EVAL - LOW (67320)   [] EVAL - MOD (86868)  [] EVAL - HIGH (46183)  [] RE-EVAL (43779)  [x] RU(68541) x  1   [] IONTO  [] NMR (32081) x      [] VASO  [x] Manual (24914) x  1    [x] Other: group  [] TA x       [] Mech Traction (00932)  [] ES(attended) (56724)      [] ES (un) (98649):     GOALS:  Patient stated goal: return to walking for distance     Therapist goals for Patient:   Short Term Goals: To be achieved in: 2 weeks  1. Independent in HEP and progression per patient tolerance, in order to prevent re-injury. MET  2. Patient will have a decrease in pain to facilitate improvement in movement, function, and ADLs as indicated by Functional Deficits. MET     Long Term Goals: To be achieved in: 6-8 weeks  1. Disability index score of 30% or less for the LEFS to assist with reaching prior level of function. 2. Patient will demonstrate increased LE flexibility to max potential to allow for proper joint functioning as indicated by patients Functional Deficits.    3. Patient will demonstrate an increase in Strength to at least 4+/5 throughout as well as good proximal hip strength and control to allow for proper functional mobility as indicated by patients Functional Deficits. 4. Patient will return to functional activities including sleeping and stairs without increased symptoms or restriction. 5. Patient will return to walking for exercise without increased symptoms or restriction          Progression Towards Functional goals:  [] Patient is progressing as expected towards functional goals listed. [] Progression is slowed due to complexities listed. [] Progression has been slowed due to co-morbidities. [x] Plan just implemented, too soon to assess goals progression  [] Other:     Persisting Functional Limitations/Impairments:  [x]Sitting [x]Standing   [x]Walking [x]Squatting/bending    [x]Stairs [x]ADL's    [x]Transfers []Reaching  []Housework [x]Job related tasks  []Driving [x]Sports/Recreation   []Other:    ASSESSMENT:      Treatment/Activity Tolerance:  [x] Patient tolerated treatment well [] Patient limited by fatique  [] Patient limited by pain  [] Patient limited by other medical complications  [x] Other: Pt. Tolerated therapy today with minimal complaints. Pt. Continues to demonstrate good technique with stretching exercises. Pt. With trigger points and tenderness in R hip flexor tendon and proximal ITB addressed through manual treatment. Pt. Noted symptom relief following manual treatment. Modified clams to hooklying position due to pain. Pt. Able to complete bridging with adduction ball squeeze. Pt. Will continue to benefit from skilled therapy in order to improve muscle balance and decrease symptoms.      Prognosis: [x] Good [x] Fair  [] Poor    Patient Requires Follow-up: [x] Yes  [] No    Return to Play:    [x]  N/A   []  Stage 1: Intro to Strength   []  Stage 2: Return to Run and Strength   []  Stage 3: Return to Jump and Strength   []  Stage 4: Dynamic Strength and Agility   []  Stage 5: Sport Specific Training     []  Ready to Return to Play, Meets All Above Stages   []

## 2018-07-31 ENCOUNTER — HOSPITAL ENCOUNTER (OUTPATIENT)
Dept: PHYSICAL THERAPY | Age: 73
Discharge: HOME | End: 2018-08-31
Admitting: ORTHOPAEDIC SURGERY

## 2018-07-31 NOTE — FLOWSHEET NOTE
Therapeutic Activities       Biodex Balance PS L10 3'  Start 7/31                 Manual Intervention           STM B proximal ITB, glute insertion  14'                                    Pt. Education  - reviewed HEP      Therapeutic Exercise and NMR EXR  [x] (45468) Provided verbal/tactile cueing for activities related to strengthening, flexibility, endurance, ROM for improvements in LE, proximal hip, and core control with self care, mobility, lifting, ambulation.  [] (96958) Provided verbal/tactile cueing for activities related to improving balance, coordination, kinesthetic sense, posture, motor skill, proprioception  to assist with LE, proximal hip, and core control in self care, mobility, lifting, ambulation and eccentric single leg control.      NMR and Therapeutic Activities:    [x] (13883 or 38239) Provided verbal/tactile cueing for activities related to improving balance, coordination, kinesthetic sense, posture, motor skill, proprioception and motor activation to allow for proper function of core, proximal hip and LE with self care and ADLs  [] (93479) Gait Re-education- Provided training and instruction to the patient for proper LE, core and proximal hip recruitment and positioning and eccentric body weight control with ambulation re-education including up and down stairs     Home Exercise Program:    [x] (62030) Reviewed/Progressed HEP activities related to strengthening, flexibility, endurance, ROM of core, proximal hip and LE for functional self-care, mobility, lifting and ambulation/stair navigation   [] (35256)Reviewed/Progressed HEP activities related to improving balance, coordination, kinesthetic sense, posture, motor skill, proprioception of core, proximal hip and LE for self care, mobility, lifting, and ambulation/stair navigation      Manual Treatments:  PROM / STM / Oscillations-Mobs:  G-I, II, III, IV (PA's, Inf., Post.)  [x] (58217) Provided manual therapy to mobilize LE, proximal hip and/or

## 2018-08-01 ENCOUNTER — HOSPITAL ENCOUNTER (OUTPATIENT)
Dept: PHYSICAL THERAPY | Age: 73
Discharge: HOME | End: 2018-08-01
Attending: ORTHOPAEDIC SURGERY | Admitting: ORTHOPAEDIC SURGERY

## 2018-08-02 ENCOUNTER — HOSPITAL ENCOUNTER (OUTPATIENT)
Dept: PHYSICAL THERAPY | Age: 73
Discharge: HOME | End: 2018-08-03
Admitting: ORTHOPAEDIC SURGERY

## 2018-08-02 NOTE — PLAN OF CARE
[address]     Physical Therapy Re-Certification Plan of Care    Dear  Dr. Hola Toney,    We had the pleasure of treating the following patient for physical therapy services at [address] [place] [address]. A summary of our findings can be found in the updated assessment below. This includes our plan of care. If you have any questions or concerns regarding these findings, please do not hesitate to contact me at the office phone number checked above. Thank you for the referral.     Physician Signature:________________________________Date:__________________  By signing above (or electronic signature), therapists plan is approved by physician      Overall Response to Treatment:   [x]Patient is responding well to treatment and improvement is noted with regards  to goals   [x]Patient should continue to improve in reasonable time if they continue HEP   []Patient has plateaued and is no longer responding to skilled PT intervention    []Patient is getting worse and would benefit from return to referring MD   []Patient unable to adhere to initial POC   [x]Other: Pt. Has improved in hip strength and mobility. Pt. Continues to have knee pain that is limiting progression with weight bearing activities. Pt. Will continue to benefit from skilled therapy in order to decrease pain with walking and daily activities.      Date range of Visits: 18-18  Total Visits: 10      Physical Therapy Daily Treatment Note  Date:  2018    Patient Name:  Mark Sepulveda    :  1945  MRN: [id number]  Medical/Treatment Diagnosis Information:  Diagnosis: M70.61, M70.62 (ICD-10-CM) - Trochanteric bursitis of both hips  Treatment Diagnosis: M25.651, M25.652 B hip stiffness  Insurance/Certification information:  PT Insurance Information: Medicare  Physician Information:  Referring Practitioner: Dr. Elva Felipe of care signed (Y/N): Y    Date of Patient follow up with Physician:     G-Code (if applicable):      Date G-Code Applied: 8/2/18    PT G-Codes  Functional Assessment Tool Used: LEFS  Score: 56% limitation  Functional Limitation: Mobility: Walking and moving around  Mobility: Walking and Moving Around Current Status (): At least 40 percent but less than 60 percent impaired, limited or restricted  Mobility: Walking and Moving Around Goal Status (): At least 20 percent but less than 40 percent impaired, limited or restricted    Progress Note: [x]  Yes  []  No  Next due by: Visit #20     Latex Allergy:  [x]NO      []YES  Preferred Language for Healthcare:   [x]English       []other:    Visit # Insurance Allowable   10 Medicare Guidelines     Pain level:  1/10 R lateral hip; 0/10 L   3/10 knees    SUBJECTIVE: Pt. States that her hips are feeling about the same as previous therapy session and she has minimal lateral hip pain at this time. Pt. Notes that yesterday her knees were really sore, possibly due to the new exercises added last therapy session. OBJECTIVE:  8/2     PROM     L R   Hip Flexion [facility]   Hip ER ~60 ~60   Hip IR ~25 ~30         Strength (0-5) Left Right   Hip Flexion - seated 4+ 4+   Hip Abduction 4 4   Hip ER 4+ 4+   Hip IR 4 4   Quads 4+ 4+ knee pain   Hamstrings 4+ knee pain 4+           RESTRICTIONS/PRECAUTIONS: hx of knee OA and LBP    Exercises/Interventions:     All exercises performed bilaterally  Therapeutic Exercises  Resistance / level Sets/sec Reps Notes   Seated HS stretch  30\" 3    Supine ITB stretch  30\" 3 6/26 - modified:  R: knee bent, pull across with L hand  L: knee bent, pull across with R hand   Hip flexor stretch - Cedric position  30\" 3 Start 6/21  Achieved optimal stretch but laying in [place] position with opposite knee flexed, but not pulling opposite knee to chest.           Bridging  W/ BS 10\" 10 7/26   Modified 7/26   SLR - abduction  3 10 Start 8/2   Prone hip extension SLR 2# 3 10 7/26 resumed resistance          Leg press DL 60# 3 10 Start 7/31                 Neuromuscular Re-ed / Therapeutic Activities        Held due to pain                 Manual Intervention           STM B proximal ITB, glute insertion  14'                                    Pt. Education  - reviewed HEP      Therapeutic Exercise and NMR EXR  [x] (76655) Provided verbal/tactile cueing for activities related to strengthening, flexibility, endurance, ROM for improvements in LE, proximal hip, and core control with self care, mobility, lifting, ambulation.  [] (46298) Provided verbal/tactile cueing for activities related to improving balance, coordination, kinesthetic sense, posture, motor skill, proprioception  to assist with LE, proximal hip, and core control in self care, mobility, lifting, ambulation and eccentric single leg control.      NMR and Therapeutic Activities:    [x] (10932 or 01199) Provided verbal/tactile cueing for activities related to improving balance, coordination, kinesthetic sense, posture, motor skill, proprioception and motor activation to allow for proper function of core, proximal hip and LE with self care and ADLs  [] (88878) Gait Re-education- Provided training and instruction to the patient for proper LE, core and proximal hip recruitment and positioning and eccentric body weight control with ambulation re-education including up and down stairs     Home Exercise Program:    [x] (35048) Reviewed/Progressed HEP activities related to strengthening, flexibility, endurance, ROM of core, proximal hip and LE for functional self-care, mobility, lifting and ambulation/stair navigation   [] (43602)Reviewed/Progressed HEP activities related to improving balance, coordination, kinesthetic sense, posture, motor skill, proprioception of core, proximal hip and LE for self care, mobility, lifting, and ambulation/stair navigation      Manual Treatments:  PROM / STM / Oscillations-Mobs:  G-I, II, III, IV (PA's, Inf., strength and control to allow for proper functional mobility as indicated by patients Functional Deficits. PROGRESSING  4. Patient will return to functional activities including sleeping and stairs without increased symptoms or restriction. ONGOING   5. Patient will return to walking for exercise without increased symptoms or restriction     ONGOING    Progression Towards Functional goals:  [] Patient is progressing as expected towards functional goals listed. [x] Progression is slowed due to complexities listed. - knee pain  [] Progression has been slowed due to co-morbidities. [] Plan just implemented, too soon to assess goals progression  [] Other:     Persisting Functional Limitations/Impairments:  []Sitting [x]Standing   [x]Walking [x]Squatting/bending    [x]Stairs [x]ADL's    []Transfers []Reaching  []Housework [x]Job related tasks  []Driving [x]Sports/Recreation   []Other:    ASSESSMENT:      Treatment/Activity Tolerance:  [x] Patient tolerated treatment well [] Patient limited by fatique  [] Patient limited by pain  [] Patient limited by other medical complications  [x] Other: Pt. Tolerated therapy today with minimal complaints. Pt. Had some knee pain limiting closed chain activities. Pt. Demonstrates good hip mobility and improving strength. Pt. Continues to have deficits in hip abduction strength. Able to increase to SLR abduction with pt. Noting fatigue only. Pt. Will continue to benefit from skilled therapy in order to decrease pain with walking and stairs.       Prognosis: [x] Good [x] Fair  [] Poor    Patient Requires Follow-up: [x] Yes  [] No    Return to Play:    [x]  N/A   []  Stage 1: Intro to Strength   []  Stage 2: Return to Run and Strength   []  Stage 3: Return to Jump and Strength   []  Stage 4: Dynamic Strength and Agility   []  Stage 5: Sport Specific Training     []  Ready to Return to Play, Meets All Above Stages   []  Not Ready for Return to Sports   Comments:            PLAN: 1-2x/wk  [x] Continue per plan of care [] Alter current plan (see comments)  [] Plan of care initiated [] Hold pending MD visit [] Discharge    Electronically signed by: Helena Leventhal PT, DPT

## 2018-08-14 ENCOUNTER — HOSPITAL ENCOUNTER (OUTPATIENT)
Dept: PHYSICAL THERAPY | Age: 73
Discharge: HOME | End: 2018-08-15
Admitting: ORTHOPAEDIC SURGERY

## 2018-08-14 NOTE — FLOWSHEET NOTE
Intro to Strength   []  Stage 2: Return to Run and Strength   []  Stage 3: Return to Jump and Strength   []  Stage 4: Dynamic Strength and Agility   []  Stage 5: Sport Specific Training     []  Ready to Return to Play, Meets All Above Stages   []  Not Ready for Return to Sports   Comments:            PLAN: 1-2x/wk  [x] Continue per plan of care [] Alter current plan (see comments)  [] Plan of care initiated [] Hold pending MD visit [] Discharge    Electronically signed by: Gabbie Jerez PT, DPT

## 2018-08-23 ENCOUNTER — HOSPITAL ENCOUNTER (OUTPATIENT)
Dept: PHYSICAL THERAPY | Age: 73
Discharge: HOME | End: 2018-08-24
Admitting: ORTHOPAEDIC SURGERY

## 2018-08-23 NOTE — FLOWSHEET NOTE
for self care, mobility, lifting, and ambulation/stair navigation      Manual Treatments:  PROM / STM / Oscillations-Mobs:  G-I, II, III, IV (PA's, Inf., Post.)  [x] (28879) Provided manual therapy to mobilize LE, proximal hip and/or LS spine soft tissue/joints for the purpose of modulating pain, promoting relaxation,  increasing ROM, reducing/eliminating soft tissue swelling/inflammation/restriction, improving soft tissue extensibility and allowing for proper ROM for normal function with self care, mobility, lifting and ambulation. Modalities:  [] (77435) Vasopneumatic compression: Utilized vasopneumatic compression to decrease edema / swelling for the purpose of improving mobility and quad tone / recruitment which will allow for increased overall function including but not limited to self-care, transfers, ambulation, and ascending / descending stairs. Modalities:    Ice - declined    Charges:  Timed Code Treatment Minutes: 31   Total Treatment Minutes: 50     [] EVAL - LOW (75363)   [] EVAL - MOD (02232)  [] EVAL - HIGH (86550)  [] RE-EVAL (36456)  [x] BX(17500) x  1   [] IONTO  [] NMR (48179) x      [] VASO  [x] Manual (09970) x  1    [x] Other: group  [] TA x       [] Mech Traction (71433)  [] ES(attended) (35444)      [] ES (un) (76595):     GOALS:  Patient stated goal: return to walking for distance     Therapist goals for Patient:   Short Term Goals: To be achieved in: 2 weeks  1. Independent in HEP and progression per patient tolerance, in order to prevent re-injury. MET  2. Patient will have a decrease in pain to facilitate improvement in movement, function, and ADLs as indicated by Functional Deficits. MET     Long Term Goals: To be achieved in: 6-8 weeks  1. Disability index score of 30% or less for the LEFS to assist with reaching prior level of function. PROGRESSING  2.  Patient will demonstrate increased LE flexibility to max potential to allow for proper joint functioning as indicated by

## 2018-08-28 ENCOUNTER — HOSPITAL ENCOUNTER (OUTPATIENT)
Dept: PHYSICAL THERAPY | Age: 73
Discharge: HOME | End: 2018-08-29
Admitting: ORTHOPAEDIC SURGERY

## 2018-08-28 NOTE — FLOWSHEET NOTE
[facility]  Orthopaedics and Sports Rehabilitation, [place]      Physical Therapy Daily Treatment Note  Date:  2018    Patient Name:  Juan Pablo Greene    :  1945  MRN: [id number]  Medical/Treatment Diagnosis Information:  Diagnosis: M70.61, M70.62 (ICD-10-CM) - Trochanteric bursitis of both hips  Treatment Diagnosis: M25.651, M25.652 B hip stiffness  Insurance/Certification information:  PT Insurance Information: Medicare  Physician Information:  Referring Practitioner: Dr. Melo Olvera of care signed (Y/N): Y    Date of Patient follow up with Physician:     G-Code (if applicable):      Date G-Code Applied: 18         Progress Note: []  Yes  [x]  No  Next due by: Visit #20     Latex Allergy:  [x]NO      []YES  Preferred Language for Healthcare:   [x]English       []other:    Visit # Insurance Allowable   13 Medicare Guidelines     Pain level:  2/10 R hip; 2/10 L hip   3.5/10 L knee     SUBJECTIVE: Pt. Reports that her hips are feeling better and her L knee is not bothering her as much. Pt. States that the pain subsided  evening but she is not sure what helped with the pain. Pt. States that she has some soreness all through her legs, and especially in her hamstrings today. Pt. Reports compliance with HEP. OBJECTIVE:       PROM     L R   Hip Flexion [facility]   Hip ER ~60 ~60   Hip IR ~25 ~30         Strength (0-5) Left Right   Hip Flexion - seated 4+ 4+   Hip Abduction 4 4   Hip ER 4+ 4+   Hip IR 4 4   Quads 4+ 4+ knee pain   Hamstrings 4+ knee pain 4+           RESTRICTIONS/PRECAUTIONS: hx of knee OA and LBP    Exercises/Interventions:     All exercises performed bilaterally  Therapeutic Exercises  Resistance / level Sets/sec Reps Notes   Seated HS stretch  30\" 3    Supine ITB stretch  30\" 3  - modified:  R: knee bent, pull across with L hand  L: knee bent, pull across with R hand   Hip flexor stretch - Cedric position  30\" 3 Start   Achieved optimal stretch but laying in Jeraldene Purl position with opposite knee flexed, but not pulling opposite knee to chest.           Bridging  W/ BS 10\" 10 7/26   Modified 7/26   SLR - flexion 3# 3 10 Start 8/28   SLR - abduction 3# 3 10 ^8/28   Prone hip extension SLR 3# 3 10 ^8/28 8/23 held due to pain                 Neuromuscular Re-ed / Therapeutic Activities        Held due to pain                 Manual Intervention           STM B hamstring  15'                                    Pt. Education  - reviewed HEP  - discussed possible causes of pain and possibility of abnormal gait mechanics from knee pain contributing to hip pain  - pt. To schedule to return to see referring MD due to slow progression in therapy      Therapeutic Exercise and NMR EXR  [x] (29311) Provided verbal/tactile cueing for activities related to strengthening, flexibility, endurance, ROM for improvements in LE, proximal hip, and core control with self care, mobility, lifting, ambulation.  [] (67825) Provided verbal/tactile cueing for activities related to improving balance, coordination, kinesthetic sense, posture, motor skill, proprioception  to assist with LE, proximal hip, and core control in self care, mobility, lifting, ambulation and eccentric single leg control.      NMR and Therapeutic Activities:    [x] (38138 or 16043) Provided verbal/tactile cueing for activities related to improving balance, coordination, kinesthetic sense, posture, motor skill, proprioception and motor activation to allow for proper function of core, proximal hip and LE with self care and ADLs  [] (76949) Gait Re-education- Provided training and instruction to the patient for proper LE, core and proximal hip recruitment and positioning and eccentric body weight control with ambulation re-education including up and down stairs     Home Exercise Program:    [x] (66134) Reviewed/Progressed HEP activities related to strengthening, flexibility, endurance, ROM of core, proximal hip and LE for functional self-care, mobility, lifting and ambulation/stair navigation   [] (00045)Reviewed/Progressed HEP activities related to improving balance, coordination, kinesthetic sense, posture, motor skill, proprioception of core, proximal hip and LE for self care, mobility, lifting, and ambulation/stair navigation      Manual Treatments:  PROM / STM / Oscillations-Mobs:  G-I, II, III, IV (PA's, Inf., Post.)  [x] (35864) Provided manual therapy to mobilize LE, proximal hip and/or LS spine soft tissue/joints for the purpose of modulating pain, promoting relaxation,  increasing ROM, reducing/eliminating soft tissue swelling/inflammation/restriction, improving soft tissue extensibility and allowing for proper ROM for normal function with self care, mobility, lifting and ambulation. Modalities:  [] (52459) Vasopneumatic compression: Utilized vasopneumatic compression to decrease edema / swelling for the purpose of improving mobility and quad tone / recruitment which will allow for increased overall function including but not limited to self-care, transfers, ambulation, and ascending / descending stairs. Modalities:    Ice - declined    Charges:  Timed Code Treatment Minutes: 26   Total Treatment Minutes: 50     [] EVAL - LOW (35647)   [] EVAL - MOD (84303)  [] EVAL - HIGH (66551)  [] RE-EVAL (00678)  [x] IJ(48478) x  1   [] IONTO  [] NMR (45208) x      [] VASO  [x] Manual (43182) x  1    [x] Other: group  [] TA x       [] Mech Traction (14035)  [] ES(attended) (79355)      [] ES (un) (41278):     GOALS:  Patient stated goal: return to walking for distance     Therapist goals for Patient:   Short Term Goals: To be achieved in: 2 weeks  1. Independent in HEP and progression per patient tolerance, in order to prevent re-injury. MET  2. Patient will have a decrease in pain to facilitate improvement in movement, function, and ADLs as indicated by Functional Deficits. MET     Long Term Goals:  To be achieved

## 2018-09-01 ENCOUNTER — HOSPITAL ENCOUNTER (OUTPATIENT)
Dept: PHYSICAL THERAPY | Age: 73
Discharge: HOME | End: 2018-09-01
Attending: ORTHOPAEDIC SURGERY | Admitting: ORTHOPAEDIC SURGERY

## 2018-09-11 ENCOUNTER — HOSPITAL ENCOUNTER (OUTPATIENT)
Dept: PHYSICAL THERAPY | Age: 73
Discharge: HOME | End: 2018-09-12
Admitting: ORTHOPAEDIC SURGERY

## 2018-09-11 NOTE — FLOWSHEET NOTE
[facility], [place]    Physical Therapy  Cancellation/No-show Note  Patient Name:  Amy Simon  :  1945   Date:  2018  Cancelled visits to date: 4  No-shows to date: 0    For today's appointment patient:  [x]  Cancelled  []  Rescheduled appointment   []  No-show     Reason given by patient:  []  Patient ill  []  Conflicting appointment  []  No transportation    []  Conflict with work  []  No reason given  []  Other:     Comments: injection yesterday    Electronically signed by:  Mike Lion PT

## 2018-09-18 ENCOUNTER — HOSPITAL ENCOUNTER (OUTPATIENT)
Dept: PHYSICAL THERAPY | Age: 73
Discharge: HOME | End: 2018-09-19
Admitting: ORTHOPAEDIC SURGERY

## 2018-09-18 NOTE — FLOWSHEET NOTE
cueing and patellar correction throughout   Lateral band walking Red band 3 laps  Start 9/18                 Neuromuscular Re-ed / Therapeutic Activities        Held due to pain                 Manual Intervention               Patellar mobilization B 4'                            Pt. Education  - reviewed HEP      Therapeutic Exercise and NMR EXR  [x] (10530) Provided verbal/tactile cueing for activities related to strengthening, flexibility, endurance, ROM for improvements in LE, proximal hip, and core control with self care, mobility, lifting, ambulation.  [] (34053) Provided verbal/tactile cueing for activities related to improving balance, coordination, kinesthetic sense, posture, motor skill, proprioception  to assist with LE, proximal hip, and core control in self care, mobility, lifting, ambulation and eccentric single leg control.      NMR and Therapeutic Activities:    [x] (51395 or 29649) Provided verbal/tactile cueing for activities related to improving balance, coordination, kinesthetic sense, posture, motor skill, proprioception and motor activation to allow for proper function of core, proximal hip and LE with self care and ADLs  [] (46742) Gait Re-education- Provided training and instruction to the patient for proper LE, core and proximal hip recruitment and positioning and eccentric body weight control with ambulation re-education including up and down stairs     Home Exercise Program:    [x] (40531) Reviewed/Progressed HEP activities related to strengthening, flexibility, endurance, ROM of core, proximal hip and LE for functional self-care, mobility, lifting and ambulation/stair navigation   [] (58959)Reviewed/Progressed HEP activities related to improving balance, coordination, kinesthetic sense, posture, motor skill, proprioception of core, proximal hip and LE for self care, mobility, lifting, and ambulation/stair navigation      Manual Treatments:  PROM / STM / Oscillations-Mobs:  G-I, II, III, IV (Eliza, Inf., Post.)  [x] (42183) Provided manual therapy to mobilize LE, proximal hip and/or LS spine soft tissue/joints for the purpose of modulating pain, promoting relaxation,  increasing ROM, reducing/eliminating soft tissue swelling/inflammation/restriction, improving soft tissue extensibility and allowing for proper ROM for normal function with self care, mobility, lifting and ambulation. Modalities:  [] (41041) Vasopneumatic compression: Utilized vasopneumatic compression to decrease edema / swelling for the purpose of improving mobility and quad tone / recruitment which will allow for increased overall function including but not limited to self-care, transfers, ambulation, and ascending / descending stairs. Modalities:    Ice - declined    Charges:  Timed Code Treatment Minutes: 46   Total Treatment Minutes: 50     [] EVAL - LOW (22647)   [] EVAL - MOD (16264)  [] EVAL - HIGH (09369)  [] RE-EVAL (87950)  [x] TP(16408) x  2   [] IONTO  [] NMR (99934) x      [] VASO  [] Manual (66764) x       [x] Other: group  [] TA x       [] Mech Traction (28089)  [] ES(attended) (90525)      [] ES (un) (72209):     GOALS:  Patient stated goal: return to walking for distance     Therapist goals for Patient:   Short Term Goals: To be achieved in: 2 weeks  1. Independent in HEP and progression per patient tolerance, in order to prevent re-injury. MET  2. Patient will have a decrease in pain to facilitate improvement in movement, function, and ADLs as indicated by Functional Deficits. MET     Long Term Goals: To be achieved in: 6-8 weeks  1. Disability index score of 30% or less for the LEFS to assist with reaching prior level of function. PROGRESSING  2. Patient will demonstrate increased LE flexibility to max potential to allow for proper joint functioning as indicated by patients Functional Deficits. PROGRESSING  3.  Patient will demonstrate an increase in Strength to at least 4+/5 throughout as well as good

## 2018-09-25 ENCOUNTER — HOSPITAL ENCOUNTER (OUTPATIENT)
Dept: PHYSICAL THERAPY | Age: 73
Setting detail: THERAPIES SERIES
Discharge: HOME | End: 2018-09-25
Payer: MEDICARE

## 2018-09-25 PROCEDURE — 97140 MANUAL THERAPY 1/> REGIONS: CPT | Performed by: PHYSICAL THERAPIST

## 2018-09-25 PROCEDURE — 97110 THERAPEUTIC EXERCISES: CPT | Performed by: PHYSICAL THERAPIST

## 2018-09-25 PROCEDURE — 97150 GROUP THERAPEUTIC PROCEDURES: CPT | Performed by: PHYSICAL THERAPIST

## 2018-09-25 NOTE — FLOWSHEET NOTE
[facility] and Sports Rehabilitation, [place]      Physical Therapy Daily Treatment Note  Date:  2018    Patient Name:  Charis Barney    :  1945  MRN: [id number]  Medical/Treatment Diagnosis Information:  Diagnosis: M70.61, M70.62 (ICD-10-CM) - Trochanteric bursitis of both hips  Treatment Diagnosis: M25.651, M25.652 B hip stiffness  Insurance/Certification information:  PT Insurance Information: Medicare  Physician Information:  Referring Practitioner: Dr. Shana Hendrickson of care signed (Y/N): Y    Date of Patient follow up with Physician:     G-Code (if applicable):      Date G-Code Applied: 18         Progress Note: []  Yes  [x]  No  Next due by: Visit #20     Latex Allergy:  [x]NO      []YES  Preferred Language for Healthcare:   [x]English       []other:    Visit # Insurance Allowable   15 Medicare Guidelines     Pain level:  1/10    SUBJECTIVE: Pt. Reports that her knees are feeling good today. She has some minimal lateral R hip pain. Pt. Reports compliance with HEP. OBJECTIVE:       PROM     L R   Hip Flexion Hip ER Hip IR Knee flexion 138 pain 135   Knee extension 0 0         Strength (0-5) Left Right   Hip Flexion - seated Hip Abduction Hip ER Hip IR Quads Hamstrings         RESTRICTIONS/PRECAUTIONS: hx of knee OA and LBP    Exercises/Interventions:     All exercises performed bilaterally  Therapeutic Exercises  Resistance / level Sets/sec Reps Notes   bike  5'            Seated HS stretch  30\" 3    Supine ITB stretch    HEP   Hip flexor stretch - Cedric position    HEP          Bridging  Blue band 10\" 10    Modified    SLR - flexion 4# 3 10 ^   SLR - abduction 4# 3 10 ^   Prone hip extension SLR 4# 3 10 ^ held due to pain   Mini squats - high/low table Upper thigh 2 10 Start , cueing throughout   Lateral band walking Blue band 3 laps  ^                 Neuromuscular Re-ed / Therapeutic Activities Held due to pain                 Manual Intervention               Patellar mobilization B 8'                            Pt. Education  - reviewed HEP      Therapeutic Exercise and NMR EXR  [x] (01049) Provided verbal/tactile cueing for activities related to strengthening, flexibility, endurance, ROM for improvements in LE, proximal hip, and core control with self care, mobility, lifting, ambulation.  [] (69853) Provided verbal/tactile cueing for activities related to improving balance, coordination, kinesthetic sense, posture, motor skill, proprioception  to assist with LE, proximal hip, and core control in self care, mobility, lifting, ambulation and eccentric single leg control.      NMR and Therapeutic Activities:    [x] (04260 or 34161) Provided verbal/tactile cueing for activities related to improving balance, coordination, kinesthetic sense, posture, motor skill, proprioception and motor activation to allow for proper function of core, proximal hip and LE with self care and ADLs  [] (15599) Gait Re-education- Provided training and instruction to the patient for proper LE, core and proximal hip recruitment and positioning and eccentric body weight control with ambulation re-education including up and down stairs     Home Exercise Program:    [x] (19996) Reviewed/Progressed HEP activities related to strengthening, flexibility, endurance, ROM of core, proximal hip and LE for functional self-care, mobility, lifting and ambulation/stair navigation   [] (19907)Reviewed/Progressed HEP activities related to improving balance, coordination, kinesthetic sense, posture, motor skill, proprioception of core, proximal hip and LE for self care, mobility, lifting, and ambulation/stair navigation      Manual Treatments:  PROM / STM / Oscillations-Mobs:  G-I, II, III, IV (PA's, Inf., Post.)  [x] (56299) Provided manual therapy to mobilize LE, proximal hip and/or LS spine soft tissue/joints for the purpose of modulating pain, functional activities including sleeping and stairs without increased symptoms or restriction. ONGOING   5. Patient will return to walking for exercise without increased symptoms or restriction     ONGOING    Progression Towards Functional goals:  [] Patient is progressing as expected towards functional goals listed. [x] Progression is slowed due to complexities listed. - knee pain  [] Progression has been slowed due to co-morbidities. [] Plan just implemented, too soon to assess goals progression  [] Other:     Persisting Functional Limitations/Impairments:  []Sitting [x]Standing   [x]Walking [x]Squatting/bending    [x]Stairs [x]ADL's    []Transfers []Reaching  []Housework [x]Job related tasks  []Driving [x]Sports/Recreation   []Other:    ASSESSMENT:      Treatment/Activity Tolerance:  [x] Patient tolerated treatment well [] Patient limited by fatique  [] Patient limited by pain  [] Patient limited by other medical complications  [x] Other: Pt. Tolerated therapy today without complaints. Pt. Demonstrates improved technique with mini squats and decreased cueing required. Pt. Challenged by current resistance with SLR. Increased manual patellar mobilization due to motion restrictions. Pt. Required cueing with lateral band walking for correct technique but able to complete with complaints of fatigue only. Pt. Will continue to benefit from skilled therapy in order to improve LE functional strength and decrease pain.      Prognosis: [x] Good [x] Fair  [] Poor    Patient Requires Follow-up: [x] Yes  [] No    Return to Play:    [x]  N/A   []  Stage 1: Intro to Strength   []  Stage 2: Return to Run and Strength   []  Stage 3: Return to Jump and Strength   []  Stage 4: Dynamic Strength and Agility   []  Stage 5: Sport Specific Training     []  Ready to Return to Play, Meets All Above Stages   []  Not Ready for Return to Sports   Comments:            PLAN: 1-2x/wk  [x] Continue per plan of care [] Alter current plan (see

## 2018-10-02 ENCOUNTER — HOSPITAL ENCOUNTER (OUTPATIENT)
Dept: PHYSICAL THERAPY | Age: 73
Setting detail: THERAPIES SERIES
Discharge: HOME | End: 2018-10-02
Payer: MEDICARE

## 2018-10-02 PROCEDURE — 97150 GROUP THERAPEUTIC PROCEDURES: CPT | Performed by: PHYSICAL THERAPIST

## 2018-10-02 PROCEDURE — 97140 MANUAL THERAPY 1/> REGIONS: CPT | Performed by: PHYSICAL THERAPIST

## 2018-10-02 PROCEDURE — 97110 THERAPEUTIC EXERCISES: CPT | Performed by: PHYSICAL THERAPIST

## 2018-10-02 NOTE — FLOWSHEET NOTE
Alter current plan (see comments)  [] Plan of care initiated [] Hold pending MD visit [] Discharge    Electronically signed by: Sloan Rosales PT, DPT

## 2018-10-16 ENCOUNTER — HOSPITAL ENCOUNTER (OUTPATIENT)
Dept: PHYSICAL THERAPY | Age: 73
Setting detail: THERAPIES SERIES
Discharge: HOME | End: 2018-10-16
Payer: MEDICARE

## 2018-10-16 PROCEDURE — 97110 THERAPEUTIC EXERCISES: CPT | Performed by: PHYSICAL THERAPIST

## 2018-10-16 PROCEDURE — 97140 MANUAL THERAPY 1/> REGIONS: CPT | Performed by: PHYSICAL THERAPIST

## 2018-10-16 PROCEDURE — 97150 GROUP THERAPEUTIC PROCEDURES: CPT | Performed by: PHYSICAL THERAPIST

## 2018-10-16 NOTE — FLOWSHEET NOTE
[facility] and Sports Rehabilitation, [place]      Physical Therapy Daily Treatment Note  Date:  10/16/2018    Patient Name:  Cara Riley    :  1945  MRN: [id number]  Medical/Treatment Diagnosis Information:  Diagnosis: M70.61, M70.62 (ICD-10-CM) - Trochanteric bursitis of both hips  Treatment Diagnosis: M25.651, M25.652 B hip stiffness  Insurance/Certification information:  PT Insurance Information: Medicare  Physician Information:  Referring Practitioner: Dr. Vandana Alonzo of care signed (Y/N): Y    Date of Patient follow up with Physician:     G-Code (if applicable):      Date G-Code Applied: 18         Progress Note: []  Yes  [x]  No  Next due by: Visit #20     Latex Allergy:  [x]NO      []YES  Preferred Language for Healthcare:   [x]English       []other:    Visit # Insurance Allowable   17 Medicare Guidelines     Pain level:  0.5/10    SUBJECTIVE: Pt. Reports that she is feeling a lot better over all. She purchased OTC knee braces and feels that these are helping a lot. Pt. States that she was able to complete HEP 2x while away over the past week. OBJECTIVE:       PROM     L R   Hip Flexion Hip ER Hip IR Knee flexion 138 pain 135   Knee extension 0 0         Strength (0-5) Left Right   Hip Flexion - seated Hip Abduction Hip ER Hip IR Quads Hamstrings         RESTRICTIONS/PRECAUTIONS: hx of knee OA and LBP    Exercises/Interventions:     All exercises performed bilaterally  Therapeutic Exercises  Resistance / level Sets/sec Reps Notes   bike  5'            Seated HS stretch  30\" 3    Supine ITB stretch    HEP   Hip flexor stretch - Cedric position    HEP          Bridging  Blue band 10\" 10    Modified    SLR - flexion 4# 3 10 ^   SLR - abduction 4# 3 10 ^   Prone hip extension SLR 4# 3 10 ^ held due to pain   Mini squats - high/low table (w/ BS) Upper thigh 1 10 Modified 10/2   Lateral band walking Blue band 3 laps ^9/25                 Neuromuscular Re-ed / Therapeutic Activities        Held due to pain                 Manual Intervention               Patellar mobilization B 8'                            Pt. Education  - reviewed HEP  - reviewed diagnosis including utilization of anatomical pictures and drawings      Therapeutic Exercise and NMR EXR  [x] (38262) Provided verbal/tactile cueing for activities related to strengthening, flexibility, endurance, ROM for improvements in LE, proximal hip, and core control with self care, mobility, lifting, ambulation.  [] (76464) Provided verbal/tactile cueing for activities related to improving balance, coordination, kinesthetic sense, posture, motor skill, proprioception  to assist with LE, proximal hip, and core control in self care, mobility, lifting, ambulation and eccentric single leg control.      NMR and Therapeutic Activities:    [x] (43688 or 15785) Provided verbal/tactile cueing for activities related to improving balance, coordination, kinesthetic sense, posture, motor skill, proprioception and motor activation to allow for proper function of core, proximal hip and LE with self care and ADLs  [] (23066) Gait Re-education- Provided training and instruction to the patient for proper LE, core and proximal hip recruitment and positioning and eccentric body weight control with ambulation re-education including up and down stairs     Home Exercise Program:    [x] (52038) Reviewed/Progressed HEP activities related to strengthening, flexibility, endurance, ROM of core, proximal hip and LE for functional self-care, mobility, lifting and ambulation/stair navigation   [] (53762)Reviewed/Progressed HEP activities related to improving balance, coordination, kinesthetic sense, posture, motor skill, proprioception of core, proximal hip and LE for self care, mobility, lifting, and ambulation/stair navigation      Manual Treatments:  PROM / STM / Oscillations-Mobs:  G-I, II, III, IV proximal hip strength and control to allow for proper functional mobility as indicated by patients Functional Deficits. PROGRESSING  4. Patient will return to functional activities including sleeping and stairs without increased symptoms or restriction. ONGOING   5. Patient will return to walking for exercise without increased symptoms or restriction     ONGOING    Progression Towards Functional goals:  [] Patient is progressing as expected towards functional goals listed. [x] Progression is slowed due to complexities listed. - knee pain  [] Progression has been slowed due to co-morbidities. [] Plan just implemented, too soon to assess goals progression  [] Other:     Persisting Functional Limitations/Impairments:  []Sitting [x]Standing   [x]Walking [x]Squatting/bending    [x]Stairs [x]ADL's    []Transfers []Reaching  []Housework [x]Job related tasks  []Driving [x]Sports/Recreation   []Other:    ASSESSMENT:      Treatment/Activity Tolerance:  [x] Patient tolerated treatment well [] Patient limited by fatique  [] Patient limited by pain  [] Patient limited by other medical complications  [x] Other: Pt. Tolerated therapy today with minimal complaints. Pt. Demonstrates good technique with SLR and stretching exercises. Pt. Continues to be challenged by current resistance program. Pt. Required occasional cueing for technique with bridging and lateral band walking but no increase in pain noted. Pt. With some knee pain noted during mini squats that subsided slight with ball squeeze to facilitate knee alignment. Pt. With good patellar mobility and decreased pain following manual mobilizations. Significant time spent will pt. Discussing anatomy and specific muscle groups being addressed in order to treat the pain. Pt. Reports understanding. Pt. Will continue to benefit from skilled therapy in order to decrease overall pain and improve functional strength/mobility.      Prognosis: [x] Good [x] Fair  [] Poor    Patient

## 2018-10-18 ENCOUNTER — HOSPITAL ENCOUNTER (OUTPATIENT)
Dept: PHYSICAL THERAPY | Age: 73
Setting detail: THERAPIES SERIES
Discharge: HOME | End: 2018-10-18
Payer: MEDICARE

## 2018-10-18 PROCEDURE — 97110 THERAPEUTIC EXERCISES: CPT | Performed by: PHYSICAL THERAPIST

## 2018-10-18 PROCEDURE — 97150 GROUP THERAPEUTIC PROCEDURES: CPT | Performed by: PHYSICAL THERAPIST

## 2018-10-18 PROCEDURE — 97016 VASOPNEUMATIC DEVICE THERAPY: CPT | Performed by: PHYSICAL THERAPIST

## 2018-10-18 PROCEDURE — 97140 MANUAL THERAPY 1/> REGIONS: CPT | Performed by: PHYSICAL THERAPIST

## 2018-10-18 NOTE — FLOWSHEET NOTE
Ready for Return to Sports   Comments:            PLAN: 1-2x/wk  [x] Continue per plan of care [] Alter current plan (see comments)  [] Plan of care initiated [] Hold pending MD visit [] Discharge    Electronically signed by: Eliceo Ortiz PT, DPT

## 2018-11-01 ENCOUNTER — HOSPITAL ENCOUNTER (OUTPATIENT)
Dept: PHYSICAL THERAPY | Age: 73
Setting detail: THERAPIES SERIES
Discharge: HOME | End: 2018-11-01
Payer: MEDICARE

## 2018-11-01 PROCEDURE — 97150 GROUP THERAPEUTIC PROCEDURES: CPT | Performed by: PHYSICAL THERAPIST

## 2018-11-01 PROCEDURE — 97110 THERAPEUTIC EXERCISES: CPT | Performed by: PHYSICAL THERAPIST

## 2018-11-01 PROCEDURE — 97140 MANUAL THERAPY 1/> REGIONS: CPT | Performed by: PHYSICAL THERAPIST

## 2018-11-06 ENCOUNTER — APPOINTMENT (OUTPATIENT)
Dept: PHYSICAL THERAPY | Age: 73
End: 2018-11-06
Payer: MEDICARE

## 2018-11-14 ENCOUNTER — HOSPITAL ENCOUNTER (OUTPATIENT)
Dept: PHYSICAL THERAPY | Age: 73
Setting detail: THERAPIES SERIES
Discharge: HOME | End: 2018-11-14
Payer: MEDICARE

## 2018-11-15 ENCOUNTER — HOSPITAL ENCOUNTER (OUTPATIENT)
Dept: VASCULAR LAB | Age: 73
Discharge: HOME | End: 2018-11-15
Payer: MEDICARE

## 2018-11-15 DIAGNOSIS — M79.605 PAIN OF LEFT LEG: Primary | ICD-10-CM

## 2018-11-15 DIAGNOSIS — M79.89 LEFT LEG SWELLING: ICD-10-CM

## 2018-11-15 PROCEDURE — 93971 EXTREMITY STUDY: CPT

## 2018-11-21 ENCOUNTER — APPOINTMENT (OUTPATIENT)
Dept: PHYSICAL THERAPY | Age: 73
End: 2018-11-21
Payer: MEDICARE

## 2018-11-27 ENCOUNTER — APPOINTMENT (OUTPATIENT)
Dept: PHYSICAL THERAPY | Age: 73
End: 2018-11-27
Payer: MEDICARE

## 2018-11-29 ENCOUNTER — HOSPITAL ENCOUNTER (OUTPATIENT)
Dept: PHYSICAL THERAPY | Age: 73
Setting detail: THERAPIES SERIES
Discharge: HOME | End: 2018-11-29
Payer: MEDICARE

## 2018-11-29 PROCEDURE — 97110 THERAPEUTIC EXERCISES: CPT | Performed by: PHYSICAL THERAPIST

## 2018-11-29 PROCEDURE — 97530 THERAPEUTIC ACTIVITIES: CPT | Performed by: PHYSICAL THERAPIST

## 2018-11-29 NOTE — PLAN OF CARE
Progression is slowed due to complexities listed. [x] Progression has been slowed due to co-morbidities. [] Plan just implemented, too soon to assess goals progression  [] Other:     Persisting Functional Limitations/Impairments:  []Sitting []Standing   []Walking [x]Squatting/bending    [x]Stairs []ADL's    []Transfers []Reaching  []Housework []Job related tasks  []Driving [x]Sports/Recreation   []Other:    ASSESSMENT:      Treatment/Activity Tolerance:  [x] Patient tolerated treatment well [] Patient limited by fatique  [] Patient limited by pain  [] Patient limited by other medical complications  [x] Other: Pt. Tolerated therapy today without complaints. Pt. Demonstrates improvements in flexibility. Pt. Demonstrates good understanding of continued program. Significant time spent with patient discussing continuation of home program and use of stationary bike for continued low impact movement. Patient is currently independent with symptom management and home exercise program. Patient reports understanding of the importance of continued compliance with home exercise program after discharge. Patient should reach all functional long term goals with compliance to home exercise program upon discharge. Prognosis: [x] Good [x] Fair  [] Poor    Patient Requires Follow-up: [] Yes  [x] No    Return to Play:    [x]  N/A   []  Stage 1: Intro to Strength   []  Stage 2: Return to Run and Strength   []  Stage 3: Return to Jump and Strength   []  Stage 4: Dynamic Strength and Agility   []  Stage 5: Sport Specific Training     []  Ready to Return to Play, [employer]   []  Not Ready for Return to Sports   Comments:            PLAN: D/C to HEP.  Pt to call with any questions and f/u with PT prn.  [] Continue per plan of care [] Alter current plan (see comments)  [] Plan of care initiated [] Hold pending MD visit [x] Discharge    Electronically signed by: Dominique Bauer PT, DPT

## 2018-11-30 PROCEDURE — G8979 MOBILITY GOAL STATUS: HCPCS | Performed by: PHYSICAL THERAPIST

## 2018-11-30 PROCEDURE — G8980 MOBILITY D/C STATUS: HCPCS | Performed by: PHYSICAL THERAPIST

## 2018-11-30 PROCEDURE — G8978 MOBILITY CURRENT STATUS: HCPCS | Performed by: PHYSICAL THERAPIST

## 2019-07-01 ENCOUNTER — OFFICE VISIT (OUTPATIENT)
Dept: ORTHOPEDIC SURGERY | Age: 74
End: 2019-07-01
Payer: MEDICARE

## 2019-07-01 VITALS
BODY MASS INDEX: 32.58 KG/M2 | DIASTOLIC BLOOD PRESSURE: 82 MMHG | SYSTOLIC BLOOD PRESSURE: 138 MMHG | HEIGHT: 68 IN | HEART RATE: 74 BPM | WEIGHT: 215 LBS

## 2019-07-01 DIAGNOSIS — M67.912 ROTATOR CUFF DYSFUNCTION, LEFT: ICD-10-CM

## 2019-07-01 DIAGNOSIS — M75.42 IMPINGEMENT SYNDROME OF LEFT SHOULDER: ICD-10-CM

## 2019-07-01 DIAGNOSIS — M25.512 LEFT SHOULDER PAIN, UNSPECIFIED CHRONICITY: Primary | ICD-10-CM

## 2019-07-01 PROCEDURE — 3017F COLORECTAL CA SCREEN DOC REV: CPT | Performed by: ORTHOPAEDIC SURGERY

## 2019-07-01 PROCEDURE — 1036F TOBACCO NON-USER: CPT | Performed by: ORTHOPAEDIC SURGERY

## 2019-07-01 PROCEDURE — G8400 PT W/DXA NO RESULTS DOC: HCPCS | Performed by: ORTHOPAEDIC SURGERY

## 2019-07-01 PROCEDURE — 4040F PNEUMOC VAC/ADMIN/RCVD: CPT | Performed by: ORTHOPAEDIC SURGERY

## 2019-07-01 PROCEDURE — 20610 DRAIN/INJ JOINT/BURSA W/O US: CPT | Performed by: ORTHOPAEDIC SURGERY

## 2019-07-01 PROCEDURE — 99214 OFFICE O/P EST MOD 30 MIN: CPT | Performed by: ORTHOPAEDIC SURGERY

## 2019-07-01 PROCEDURE — G8427 DOCREV CUR MEDS BY ELIG CLIN: HCPCS | Performed by: ORTHOPAEDIC SURGERY

## 2019-07-01 PROCEDURE — 1090F PRES/ABSN URINE INCON ASSESS: CPT | Performed by: ORTHOPAEDIC SURGERY

## 2019-07-01 PROCEDURE — G8417 CALC BMI ABV UP PARAM F/U: HCPCS | Performed by: ORTHOPAEDIC SURGERY

## 2019-07-01 PROCEDURE — 1123F ACP DISCUSS/DSCN MKR DOCD: CPT | Performed by: ORTHOPAEDIC SURGERY

## 2019-07-01 NOTE — PROGRESS NOTES
(VOLTAREN) 1 % GEL Apply 2 g topically 2 times daily      naproxen (NAPROSYN) 500 MG tablet Take 1 tablet by mouth 2 times daily (with meals) 60 tablet 3    Naproxen Sodium (ALEVE PO) Take by mouth      Ca Phosphate-Cholecalciferol 200-200 MG-UNIT CHEW Take by mouth      Multiple Vitamins-Minerals (MULTIVITAMIN & MINERAL PO) Take 1 tablet by mouth daily       omeprazole (PRILOSEC) 20 MG capsule Take 20 mg by mouth daily. No current facility-administered medications for this visit. Allergies   Allergen Reactions    Bacitracin Swelling    Doxycycline Swelling     Face  And eyes    Nitrofuran Derivatives Swelling    Amoxicillin Rash    Ampicillin Rash    Betadine  [Povidone Iodine] Rash    Penicillins Rash    Percocet [Oxycodone-Acetaminophen] Rash       Vital signs:  /82   Pulse 74   Ht 5' 7.5\" (1.715 m)   Wt 215 lb (97.5 kg)   BMI 33.18 kg/m²          Neuro: Alert & oriented x 3,  normal,  no focal deficits noted. Normal affect. Eyes: sclera clear  Ears: Normal external ear  Mouth:  No perioral lesions  Pulm: Respirations unlabored and regular  Pulse: Extremities well perfused. 2+ peripheral pulses. Skin: Warm. No ulcerations. Constitutional: The physical examination finds the patient to be well-developed and well-nourished. The patient is alert and oriented x3 and was cooperative throughout the visit. Left shoulder exam    Inspection:  Held in a normal posture. Normal contour at the acromioclavicular joint. No swelling, ecchymosis, or erythema about the shoulder. No atrophy appreciated. No scapular winging. Palpation:  No subacromial crepitus. Mild tenderness of the AC joint. Moderate greater tuberosity tenderness. Mild tenderness in the bicipital groove. Range of Motion: Full passive and active ROM. Normal scapulothoracic rhythm. Strength: 4 to 4+/5 supraspinatus and infraspinatus, and normal subscapularis muscle strength.     Stability: No anterior

## 2019-07-24 ENCOUNTER — HOSPITAL ENCOUNTER (OUTPATIENT)
Dept: PHYSICAL THERAPY | Age: 74
Setting detail: THERAPIES SERIES
Discharge: HOME | End: 2019-07-24
Payer: MEDICARE

## 2019-07-24 PROCEDURE — 97110 THERAPEUTIC EXERCISES: CPT | Performed by: PHYSICAL THERAPIST

## 2019-07-24 PROCEDURE — 97530 THERAPEUTIC ACTIVITIES: CPT | Performed by: PHYSICAL THERAPIST

## 2019-07-24 PROCEDURE — 97161 PT EVAL LOW COMPLEX 20 MIN: CPT | Performed by: PHYSICAL THERAPIST

## 2019-07-24 NOTE — FLOWSHEET NOTE
[address] [facility]    Physical Therapy Daily Treatment Note  Date:  2019    Patient Name:  Mary Medrano    :  1945  MRN: [id number]  Medical/Treatment Diagnosis Information:  · Diagnosis: M67.912 (ICD-10-CM) - Rotator cuff dysfunction, left  · Treatment Diagnosis: M25.512 (ICD-10-CM) - Left shoulder pain, unspecified chronicity; R53.1 weakness  Insurance/Certification information:  PT Insurance Information: Medicare  Physician Information:  Referring Practitioner: Dr. Pritesh Snider of care signed (Y/N):     Date of Patient follow up with Physician:     Progress Note: [x]  Yes  []  No  Next due by: Visit #10      Latex Allergy:  [x]NO      []YES  Preferred Language for Healthcare:   [x]English       []other:    Visit # Insurance Allowable Date Range   1  Medicare Guidelines n/a     Pain level:  3-410     SUBJECTIVE:  See eval    OBJECTIVE: See eval   Observation:    Test measurements:      RESTRICTIONS/PRECAUTIONS: osteopenia    Exercises/Interventions:   Therapeutic Exercise (10682) x16' Resistance / level Sets / Seconds  Reps Notes/Cues   Wall slides  10\" 10    pulleys npv      Doorway pec stretch Hands down 10\" 10           B band ER orange 3 10 Cueing for symmetrical movement                                             Therapeutic Activities (78594) x8'       Pt. education  5'  See below   Chin tucks  5\" 10 For improved postural control                        Neuromuscular Re-ed (85375)                                           Manual Intervention (.[id number])       Shld /GH Mobs       Post Cap mobs       Thoracic/Rib manipualtion       CT MT/Mobs       PROM MT                  Pt. Education:  -pt educated on diagnosis, prognosis and expectations for rehab  -pt provided with written and illustrated instructions for HEP  -all pt questions were answered    Therapeutic Exercise and NMR EXR  [x] (34998) Provided verbal/tactile cueing for

## 2019-07-31 ENCOUNTER — HOSPITAL ENCOUNTER (OUTPATIENT)
Dept: PHYSICAL THERAPY | Age: 74
Setting detail: THERAPIES SERIES
Discharge: HOME | End: 2019-07-31
Payer: MEDICARE

## 2019-07-31 PROCEDURE — 97110 THERAPEUTIC EXERCISES: CPT

## 2019-07-31 NOTE — FLOWSHEET NOTE
[address] [facility]    Physical Therapy Daily Treatment Note  Date:  2019    Patient Name:  Prosper Kelly    :  1945  MRN: [id number]  Medical/Treatment Diagnosis Information:  · Diagnosis: M67.912 (ICD-10-CM) - Rotator cuff dysfunction, left  · Treatment Diagnosis: M25.512 (ICD-10-CM) - Left shoulder pain, unspecified chronicity; R53.1 weakness  Insurance/Certification information:  PT Insurance Information: Medicare  Physician Information:  Referring Practitioner: Dr. Yoanna Peerra of care signed (Y/N):     Date of Patient follow up with Physician:     Progress Note: [x]  Yes  []  No  Next due by: Visit #10      Latex Allergy:  [x]NO      []YES  Preferred Language for Healthcare:   [x]English       []other:    Visit # Insurance Allowable Date Range   2  Medicare Guidelines n/a     Pain level:  1-1.5/10     SUBJECTIVE: States shoulder feels really good today, minimal pain. Has good days and bad days. OBJECTIVE: See eval   Observation:    Test measurements:      RESTRICTIONS/PRECAUTIONS: osteopenia    Exercises/Interventions:   Therapeutic Exercise (13049) x16' Resistance / level Sets / Seconds  Reps Notes/Cues   Wall slides  10\" 10 Cueing to limit shoulder elevation, posture and maintain scapular retraction.     pulleys  1 20 Added    Doorway pec stretch Hands down 10\" 10           B band ER orange 3 10 Cueing for symmetrical movement   theraband rows lime 3 10 Added    theraband extension lime 3 10 Added                                Therapeutic Activities (08973) x8'        See below   Chin tucks  5\" 10 For improved postural control   Scapular retraction isometrics  30\" 3 Added                  Neuromuscular Re-ed (44318)                                           Manual Intervention (11465)       Shld /GH Mobs       Post Cap mobs       Thoracic/Rib manipualtion       CT MT/Mobs       PROM MT                  Pt.

## 2019-08-08 ENCOUNTER — HOSPITAL ENCOUNTER (OUTPATIENT)
Dept: PHYSICAL THERAPY | Age: 74
Setting detail: THERAPIES SERIES
Discharge: HOME | End: 2019-08-08
Payer: MEDICARE

## 2019-08-08 PROCEDURE — 97110 THERAPEUTIC EXERCISES: CPT | Performed by: PHYSICAL THERAPIST

## 2019-08-08 PROCEDURE — 97140 MANUAL THERAPY 1/> REGIONS: CPT | Performed by: PHYSICAL THERAPIST

## 2019-08-08 NOTE — FLOWSHEET NOTE
[address] [facility]    Physical Therapy Daily Treatment Note  Date:  2019    Patient Name:  Endy Olivares    :  1945  MRN: [id number]  Medical/Treatment Diagnosis Information:  · Diagnosis: M67.912 (ICD-10-CM) - Rotator cuff dysfunction, left  · Treatment Diagnosis: M25.512 (ICD-10-CM) - Left shoulder pain, unspecified chronicity; R53.1 weakness  Insurance/Certification information:  PT Insurance Information: Medicare  Physician Information:  Referring Practitioner: Dr. Hailey Villalta of care signed (Y/N): Y    Date of Patient follow up with Physician:     Progress Note: []  Yes  [x]  No  Next due by: Visit #10      Latex Allergy:  [x]NO      []YES  Preferred Language for Healthcare:   [x]English       []other:    Visit # Insurance Allowable Date Range   3  Medicare Guidelines n/a     Pain level:  1/10     SUBJECTIVE: Pt. Reports that she continues to have pain on top of her shoulder that goes up into the side of her neck. Pt. Feels that the pain has stayed about the same. Pt. Notes that sometimes she has difficulty sleeping due to discomfort after exercises. Pt. Reports compliance with HEP. OBJECTIVE:    Palpation: tenderness L UT and scapular stabilizers   Pain/tightness with end range shoulder elevation    RESTRICTIONS/PRECAUTIONS: osteopenia    Exercises/Interventions:   Therapeutic Exercise (02706) x24' Resistance / level Sets / Seconds  Reps Notes/Cues   Wall slides  10\" 10 Cueing to limit shoulder elevation, posture and maintain scapular retraction.     pulleys  1 20 Added    Doorway pec stretch Hands down 10\" 10    Genie stretch  10\" 10 Start    UT stretch  10\" 10 Start           B band ER orange 3 10 Cueing for symmetrical movement   theraband rows lime 3 10 Added    theraband extension lime 3 10 Added           SL ER npv                    Therapeutic Activities (34163) x6'        See below   Chin tucks  5\" 10 Obie [place] PT

## 2019-08-15 ENCOUNTER — APPOINTMENT (OUTPATIENT)
Dept: PHYSICAL THERAPY | Age: 74
End: 2019-08-15
Payer: MEDICARE

## 2019-08-22 ENCOUNTER — HOSPITAL ENCOUNTER (OUTPATIENT)
Dept: PHYSICAL THERAPY | Age: 74
Setting detail: THERAPIES SERIES
Discharge: HOME | End: 2019-08-22
Payer: MEDICARE

## 2019-08-22 PROCEDURE — 97110 THERAPEUTIC EXERCISES: CPT | Performed by: PHYSICAL THERAPIST

## 2019-08-22 PROCEDURE — 97140 MANUAL THERAPY 1/> REGIONS: CPT | Performed by: PHYSICAL THERAPIST

## 2019-08-27 ENCOUNTER — HOSPITAL ENCOUNTER (OUTPATIENT)
Dept: PHYSICAL THERAPY | Age: 74
Setting detail: THERAPIES SERIES
Discharge: HOME | End: 2019-08-27
Payer: MEDICARE

## 2019-08-27 PROCEDURE — 97110 THERAPEUTIC EXERCISES: CPT | Performed by: PHYSICAL THERAPIST

## 2019-08-27 PROCEDURE — 97140 MANUAL THERAPY 1/> REGIONS: CPT | Performed by: PHYSICAL THERAPIST

## 2019-08-27 NOTE — FLOWSHEET NOTE
Manual Treatments:  PROM / STM / Oscillations-Mobs:  G-I, II, III, IV (PA's, Inf., Post.)  [x] (73825) Provided manual therapy to mobilize soft tissue/joints of cervical/CT, scapular GHJ and UE for the purpose of modulating pain, promoting relaxation,  increasing ROM, reducing/eliminating soft tissue swelling/inflammation/restriction, improving soft tissue extensibility and allowing for proper ROM for normal function with self care, reaching, carrying, lifting, house/yardwork, driving/computer work    Modalities:      Charges:  Timed Code Treatment Minutes: 39   Total Treatment Minutes: 47       [] EVAL - LOW (12491)   [] EVAL - MOD (79739)  [] EVAL - HIGH (67079)  [] RE-EVAL (70595)  [x] KE(53062) x 2     [] Ionto  [] NMR (81641) x      [] Vaso  [x] Manual (05788) x 1     [] Ultrasound:  [] TA x       [] Mech Traction (10403)  [] Home Management Training x    [] ES (un) (04478):   [] Aquatic    [] ES(attended) (10104)  [] Other:                     GOALS:  Patient stated goal: decrease pain with daily activities    Therapist goals for Patient:   Short Term Goals: To be achieved in: 2 weeks  1. Independent in HEP and progression per patient tolerance, in order to prevent re-injury. 2. Patient will have a decrease in pain to facilitate improvement in movement, function, and ADLs as indicated by Functional Deficits. Long Term Goals: To be achieved in: 6-8 weeks  1. Disability index score of 15% or less for the UEFI or Quick DASH to assist with reaching prior level of function. 2. Patient will demonstrate increased AROM to 155 L shoulder elevation without pain to allow for proper joint functioning as indicated by patients Functional Deficits. 3. Patient will demonstrate an increase in Strength to at least 4+/5 throughout to allow for proper functional mobility as indicated by patients Functional Deficits.    4. Patient will return to functional activities including sleeping and reaching without increased symptoms or restriction. 5. Patient will return to driving and computer work without increased symptoms or restriction. Progression Towards Functional goals:  [x] Patient is progressing as expected towards functional goals listed. [] Progression is slowed due to complexities listed. [] Progression has been slowed due to co-morbidities. [x] Plan just implemented, too soon to assess goals progression  [] Other:     Persisting Functional Limitations/Impairments:  []Sitting []Standing   []Transfers  [x]Sleeping   [x]Reaching [x]Lifting   [x]ADLs [x]Housework  [x]Driving [x]Job related tasks  [x]Sports/Recreation []Other:    ASSESSMENT:      Treatment/Activity Tolerance:  [x] Patient tolerated treatment well [] Patient limited by fatique  [] Patient limited by pain  [] Patient limited by other medical complications  [x] Other: Pt. Tolerated therapy today without complaints. Pt. Requires occasional tactile cueing for correct muscle activation with scapular control exercises. Correct technique with chin tucks. Decreased tenderness noted with UT STM. Pt. Demonstrates functional shoulder motion with manual PROM. Plan for transition to independent home program over next 1-2 therapy sessions if pt. Continues to progress as expected. Prognosis:  [x] Good [] Fair  [] Poor    Patient Requires Follow-up: [x] Yes  [] No    Return to Play:    [x]  N/A   []  Stage 1: Intro to Strength   []  Stage 2: Dynamic Strength and Intro to Plyometrics   []  Stage 3: Advanced Plyometrics and Intro to Throwing   []  Stage 4: Sport specific Training/Return to Sport   []  Ready to Return to Play, [employer]   []  Not Ready for Return to Sports   Comments:      PLAN: See eval. PT 1-2x / week for 6-8 weeks.   [x] Continue per plan of care [] Alter current plan (see comments)  [] Plan of care initiated [] Hold pending MD visit [] Discharge    Electronically signed by: Beti Hurt PT

## 2019-09-10 ENCOUNTER — HOSPITAL ENCOUNTER (OUTPATIENT)
Dept: PHYSICAL THERAPY | Age: 74
Setting detail: THERAPIES SERIES
Discharge: HOME | End: 2019-09-10
Payer: MEDICARE

## 2019-09-10 PROCEDURE — 97150 GROUP THERAPEUTIC PROCEDURES: CPT | Performed by: PHYSICAL THERAPIST

## 2019-09-10 PROCEDURE — 97110 THERAPEUTIC EXERCISES: CPT | Performed by: PHYSICAL THERAPIST

## 2019-09-10 PROCEDURE — 97140 MANUAL THERAPY 1/> REGIONS: CPT | Performed by: PHYSICAL THERAPIST

## 2019-09-10 NOTE — FLOWSHEET NOTE
Serratus punches 5# 3 10 Start 8/22          Therapeutic Activities (55075) x3'        See below   Chin tucks  5\" 10 For improved postural control, corrected movement   Scapular retraction isometrics  30\" 3 Added 7/31                 Neuromuscular Re-ed (97127)                                           Manual Intervention (88176) x11'       Shld /GH Mobs       Post Cap mobs       STM L UT 2'     Supine UT stretch  3'     PROM MT shd flex, abd, rotation 6'                Pt. Education:  -reviewed HEP to be completed 1x/day  -increase frequency of ice to help with inflammation and soreness    Therapeutic Exercise and NMR EXR  [x] (75119) Provided verbal/tactile cueing for activities related to strengthening, flexibility, endurance, ROM  for improvements in scapular, scapulothoracic and UE control with self care, reaching, carrying, lifting, house/yardwork, driving/computer work.    [] (02460) Provided verbal/tactile cueing for activities related to improving balance, coordination, kinesthetic sense, posture, motor skill, proprioception  to assist with  scapular, scapulothoracic and UE control with self care, reaching, carrying, lifting, house/yardwork, driving/computer work. Therapeutic Activities:    [x] (53196 or 21182) Provided verbal/tactile cueing for activities related to improving balance, coordination, kinesthetic sense, posture, motor skill, proprioception and motor activation to allow for proper function of scapular, scapulothoracic and UE control with self care, carrying, lifting, driving/computer work.      Home Exercise Program:    [x] (53763) Reviewed/Progressed HEP activities related to strengthening, flexibility, endurance, ROM of scapular, scapulothoracic and UE control with self care, reaching, carrying, lifting, house/yardwork, driving/computer work  [] (57232) Reviewed/Progressed HEP activities related to improving balance, coordination, kinesthetic sense, posture, motor skill, proprioception of scapular, scapulothoracic and UE control with self care, reaching, carrying, lifting, house/yardwork, driving/computer work      Manual Treatments:  PROM / STM / Oscillations-Mobs:  G-I, II, III, IV (PA's, Inf., Post.)  [x] (30335) Provided manual therapy to mobilize soft tissue/joints of cervical/CT, scapular GHJ and UE for the purpose of modulating pain, promoting relaxation,  increasing ROM, reducing/eliminating soft tissue swelling/inflammation/restriction, improving soft tissue extensibility and allowing for proper ROM for normal function with self care, reaching, carrying, lifting, house/yardwork, driving/computer work    Modalities:      Charges:  Timed Code Treatment Minutes: 30   Total Treatment Minutes: 50       [] EVAL - LOW (99854)   [] EVAL - MOD (11681)  [] EVAL - HIGH (44731)  [] RE-EVAL (18059)  [x] RE(34440) x 1     [] Ionto  [] NMR (68652) x      [] Vaso  [x] Manual (84291) x 1     [] Ultrasound:  [] TA x       [] Mech Traction (44357)  [] Home Management Training x    [] ES (un) (27092):   [] Aquatic    [] ES(attended) (66557)  [x] Other: group                     GOALS:  Patient stated goal: decrease pain with daily activities    Therapist goals for Patient:   Short Term Goals: To be achieved in: 2 weeks  1. Independent in HEP and progression per patient tolerance, in order to prevent re-injury. 2. Patient will have a decrease in pain to facilitate improvement in movement, function, and ADLs as indicated by Functional Deficits. Long Term Goals: To be achieved in: 6-8 weeks  1. Disability index score of 15% or less for the UEFI or Quick DASH to assist with reaching prior level of function. 2. Patient will demonstrate increased AROM to 155 L shoulder elevation without pain to allow for proper joint functioning as indicated by patients Functional Deficits.    3. Patient will demonstrate an increase in Strength to at least 4+/5 throughout to allow for proper functional mobility as indicated

## 2019-09-17 ENCOUNTER — HOSPITAL ENCOUNTER (OUTPATIENT)
Dept: PHYSICAL THERAPY | Age: 74
Setting detail: THERAPIES SERIES
Discharge: HOME | End: 2019-09-17
Payer: MEDICARE

## 2019-09-17 PROCEDURE — 97110 THERAPEUTIC EXERCISES: CPT | Performed by: PHYSICAL THERAPIST

## 2019-09-17 PROCEDURE — 97140 MANUAL THERAPY 1/> REGIONS: CPT | Performed by: PHYSICAL THERAPIST

## 2019-09-17 NOTE — FLOWSHEET NOTE
Therapeutic Activities (14598) x5'        See below   Chin tucks  5\" 10 For improved postural control, corrected technique   Scapular retraction isometrics  30\" 3 Added 7/31                 Neuromuscular Re-ed (15363)                                           Manual Intervention (65344) x10'       Shld /GH Mobs       Post Cap mobs       STM L UT 2'     Supine UT stretch  3'     PROM MT shd flex, abd, rotation 5'                Pt. Education:  -reviewed HEP to be completed 1x/day    Therapeutic Exercise and NMR EXR  [x] (65646) Provided verbal/tactile cueing for activities related to strengthening, flexibility, endurance, ROM  for improvements in scapular, scapulothoracic and UE control with self care, reaching, carrying, lifting, house/yardwork, driving/computer work.    [] (06613) Provided verbal/tactile cueing for activities related to improving balance, coordination, kinesthetic sense, posture, motor skill, proprioception  to assist with  scapular, scapulothoracic and UE control with self care, reaching, carrying, lifting, house/yardwork, driving/computer work. Therapeutic Activities:    [x] (23535 or 13858) Provided verbal/tactile cueing for activities related to improving balance, coordination, kinesthetic sense, posture, motor skill, proprioception and motor activation to allow for proper function of scapular, scapulothoracic and UE control with self care, carrying, lifting, driving/computer work.      Home Exercise Program:    [x] (24143) Reviewed/Progressed HEP activities related to strengthening, flexibility, endurance, ROM of scapular, scapulothoracic and UE control with self care, reaching, carrying, lifting, house/yardwork, driving/computer work  [] (17933) Reviewed/Progressed HEP activities related to improving balance, coordination, kinesthetic sense, posture, motor skill, proprioception of scapular, scapulothoracic and UE control with self care, reaching, carrying, lifting, house/yardwork, driving/computer work      Manual Treatments:  PROM / STM / Oscillations-Mobs:  G-I, II, III, IV (PA's, Inf., Post.)  [x] (19949) Provided manual therapy to mobilize soft tissue/joints of cervical/CT, scapular GHJ and UE for the purpose of modulating pain, promoting relaxation,  increasing ROM, reducing/eliminating soft tissue swelling/inflammation/restriction, improving soft tissue extensibility and allowing for proper ROM for normal function with self care, reaching, carrying, lifting, house/yardwork, driving/computer work    Modalities:      Charges:  Timed Code Treatment Minutes: 39   Total Treatment Minutes: 46       [] EVAL - LOW (05796)   [] EVAL - MOD (72984)  [] EVAL - HIGH (14680)  [] RE-EVAL (55719)  [x] AP(78604) x 2     [] Ionto  [] NMR (10424) x      [] Vaso  [x] Manual (94536) x 1     [] Ultrasound:  [] TA x       [] Mech Traction (17515)  [] Home Management Training x    [] ES (un) (35729):   [] Aquatic    [] ES(attended) (33331)  [] Other: group                     GOALS:  Patient stated goal: decrease pain with daily activities    Therapist goals for Patient:   Short Term Goals: To be achieved in: 2 weeks  1. Independent in HEP and progression per patient tolerance, in order to prevent re-injury. MET  2. Patient will have a decrease in pain to facilitate improvement in movement, function, and ADLs as indicated by Functional Deficits. MET    Long Term Goals: To be achieved in: 6-8 weeks  1. Disability index score of 15% or less for the UEFI or Quick DASH to assist with reaching prior level of function. 2. Patient will demonstrate increased AROM to 155 L shoulder elevation without pain to allow for proper joint functioning as indicated by patients Functional Deficits. 3. Patient will demonstrate an increase in Strength to at least 4+/5 throughout to allow for proper functional mobility as indicated by patients Functional Deficits.    4. Patient will return to functional activities including Hold pending MD visit [] Discharge    Electronically signed by: Davon Harrison PT

## 2019-09-26 ENCOUNTER — HOSPITAL ENCOUNTER (OUTPATIENT)
Dept: PHYSICAL THERAPY | Age: 74
Setting detail: THERAPIES SERIES
Discharge: HOME | End: 2019-09-26
Payer: MEDICARE

## 2019-09-26 PROCEDURE — 97140 MANUAL THERAPY 1/> REGIONS: CPT | Performed by: PHYSICAL THERAPIST

## 2019-09-26 PROCEDURE — 97110 THERAPEUTIC EXERCISES: CPT | Performed by: PHYSICAL THERAPIST

## 2019-10-03 ENCOUNTER — HOSPITAL ENCOUNTER (OUTPATIENT)
Dept: PHYSICAL THERAPY | Age: 74
Setting detail: THERAPIES SERIES
Discharge: HOME | End: 2019-10-03
Payer: MEDICARE

## 2019-10-03 PROCEDURE — 97110 THERAPEUTIC EXERCISES: CPT | Performed by: PHYSICAL THERAPIST

## 2019-10-03 PROCEDURE — 97140 MANUAL THERAPY 1/> REGIONS: CPT | Performed by: PHYSICAL THERAPIST

## 2019-10-08 ENCOUNTER — HOSPITAL ENCOUNTER (OUTPATIENT)
Dept: PHYSICAL THERAPY | Age: 74
Setting detail: THERAPIES SERIES
Discharge: HOME | End: 2019-10-08
Payer: MEDICARE

## 2019-10-08 PROCEDURE — 97110 THERAPEUTIC EXERCISES: CPT | Performed by: PHYSICAL THERAPIST

## 2019-10-08 PROCEDURE — 97140 MANUAL THERAPY 1/> REGIONS: CPT | Performed by: PHYSICAL THERAPIST

## 2021-02-22 ENCOUNTER — OFFICE VISIT (OUTPATIENT)
Dept: ORTHOPEDIC SURGERY | Age: 76
End: 2021-02-22
Payer: MEDICARE

## 2021-02-22 VITALS — WEIGHT: 215 LBS | TEMPERATURE: 97.1 F | HEIGHT: 68 IN | BODY MASS INDEX: 32.58 KG/M2

## 2021-02-22 DIAGNOSIS — M25.532 LEFT WRIST PAIN: Primary | ICD-10-CM

## 2021-02-22 DIAGNOSIS — M65.4 DE QUERVAIN'S TENOSYNOVITIS, LEFT: ICD-10-CM

## 2021-02-22 PROCEDURE — G8417 CALC BMI ABV UP PARAM F/U: HCPCS | Performed by: ORTHOPAEDIC SURGERY

## 2021-02-22 PROCEDURE — 20605 DRAIN/INJ JOINT/BURSA W/O US: CPT | Performed by: ORTHOPAEDIC SURGERY

## 2021-02-22 PROCEDURE — G8427 DOCREV CUR MEDS BY ELIG CLIN: HCPCS | Performed by: ORTHOPAEDIC SURGERY

## 2021-02-22 PROCEDURE — 1123F ACP DISCUSS/DSCN MKR DOCD: CPT | Performed by: ORTHOPAEDIC SURGERY

## 2021-02-22 PROCEDURE — 1036F TOBACCO NON-USER: CPT | Performed by: ORTHOPAEDIC SURGERY

## 2021-02-22 PROCEDURE — G8400 PT W/DXA NO RESULTS DOC: HCPCS | Performed by: ORTHOPAEDIC SURGERY

## 2021-02-22 PROCEDURE — G8484 FLU IMMUNIZE NO ADMIN: HCPCS | Performed by: ORTHOPAEDIC SURGERY

## 2021-02-22 PROCEDURE — 4040F PNEUMOC VAC/ADMIN/RCVD: CPT | Performed by: ORTHOPAEDIC SURGERY

## 2021-02-22 PROCEDURE — 99213 OFFICE O/P EST LOW 20 MIN: CPT | Performed by: ORTHOPAEDIC SURGERY

## 2021-02-22 PROCEDURE — 1090F PRES/ABSN URINE INCON ASSESS: CPT | Performed by: ORTHOPAEDIC SURGERY

## 2021-02-22 RX ORDER — METHYLPREDNISOLONE ACETATE 40 MG/ML
40 INJECTION, SUSPENSION INTRA-ARTICULAR; INTRALESIONAL; INTRAMUSCULAR; SOFT TISSUE ONCE
Status: COMPLETED | OUTPATIENT
Start: 2021-02-22 | End: 2021-02-22

## 2021-02-22 RX ORDER — ROPIVACAINE HYDROCHLORIDE 5 MG/ML
30 INJECTION, SOLUTION EPIDURAL; INFILTRATION; PERINEURAL ONCE
Status: COMPLETED | OUTPATIENT
Start: 2021-02-22 | End: 2021-02-22

## 2021-02-22 RX ADMIN — ROPIVACAINE HYDROCHLORIDE 30 ML: 5 INJECTION, SOLUTION EPIDURAL; INFILTRATION; PERINEURAL at 11:34

## 2021-02-22 RX ADMIN — METHYLPREDNISOLONE ACETATE 40 MG: 40 INJECTION, SUSPENSION INTRA-ARTICULAR; INTRALESIONAL; INTRAMUSCULAR; SOFT TISSUE at 11:33

## 2021-02-22 NOTE — PROGRESS NOTES
[facility] and [facility]  Office Visit  New Patient  Date:  2021    Name:  Giancarlo Bolden  Address:  [address] [address]    :  1945      Age:   67 y.o.    SSN:  [id number]      Medical Record Number:  [id number]    Chief Complaint:    Left wrist pain    HPI:   Giancarlo Bolden is a 67 y.o. female who presents for evaluation of her left wrist.  Patient states she has had left wrist pain for approximately 2 months. She states she was carrying in some groceries and felt a twinge in her left wrist.  She states since then she has just been living with the pain. She has not done any formal physical therapy occasionally will take naproxen. Locates the pain to the radial aspect of her wrist.  Denies any numbness or tingling. States the pain can radiate to the dorsal aspect of her thumb. States she had no issues with her wrist prior to 2 months ago. She is right-hand dominant. She denies any new numbness, tingling, fevers, chills, chest pain, shortness of breath, or any other new significant symptoms.     Pain Assessment  Location of Pain: Wrist  Location Modifiers: Left  Severity of Pain: 2  Quality of Pain: Aching, Sharp  Duration of Pain: Persistent  Frequency of Pain: Intermittent  Date Pain First Started: 20  Aggravating Factors: Bending, Stretching, Straightening  Limiting Behavior: Some  Relieving Factors: Rest  Result of Injury: No  Work-Related Injury: No  Are there other pain locations you wish to document?: No    Past History:  Past Medical History:   Diagnosis Date    Arthritis     Connective tissue anomaly BEHSUTS DX    Hx of blood clots     left leg after knee surgery    Hypertension     no medications currently    Knee pain     LPRD (laryngopharyngeal reflux disease)     Rotator cuff injury     Vascular disease        Past Surgical History:   Procedure Laterality Date    CATARACT REMOVAL WITH IMPLANT Bilateral  FOOT NEUROMA SURGERY      KNEE ARTHROSCOPY  07/18/2016    left    KNEE SURGERY Right 12 21 15    Arthroscopy, Chondroplasty,Synovectomy,, medial meniectomy    SHOULDER ARTHROSCOPY Right 2009    by Dr. Winston Briceno ARTHROSCOPY Left     SHOULDER SURGERY  07/24/2016    RIGHT SHOULDER ARTHROSCOPY WITH DEBRIDEMENT, ROTATOR CUFF       Social History     Tobacco Use    Smoking status: Never Smoker    Smokeless tobacco: Never Used   Substance Use Topics    Alcohol use: Yes     Alcohol/week: 1.0 standard drinks     Types: 1 Glasses of wine per week     Comment: wine daily    Drug use: No        Family History:  family history is not on file. Current Outpatient Medications:     diclofenac sodium (VOLTAREN) 1 % GEL, Apply 2 g topically 2 times daily, Disp: , Rfl:     naproxen (NAPROSYN) 500 MG tablet, Take 1 tablet by mouth 2 times daily (with meals), Disp: 60 tablet, Rfl: 3    Naproxen Sodium (ALEVE PO), Take by mouth, Disp: , Rfl:     Ca Phosphate-Cholecalciferol 200-200 MG-UNIT CHEW, Take by mouth, Disp: , Rfl:     Multiple Vitamins-Minerals (MULTIVITAMIN & MINERAL PO), Take 1 tablet by mouth daily , Disp: , Rfl:     omeprazole (PRILOSEC) 20 MG capsule, Take 20 mg by mouth daily. , Disp: , Rfl:       Allergies   Allergen Reactions    Bacitracin Swelling    Doxycycline Swelling     Face  And eyes    Nitrofuran Derivatives Swelling    Amoxicillin Rash    Ampicillin Rash    Betadine  [Povidone Iodine] Rash    Penicillins Rash    Percocet [Oxycodone-Acetaminophen] Rash         Review of Systems: A 14 point review of systems available in the scanned medical record as documented by the patient on 2/22/21. Today's review pertinent items are noted in HPI. No notes on file    Physical Exam:  Temp 97.1 °F (36.2 °C)   Ht 5' 7.5\" (1.715 m)   Wt 215 lb (97.5 kg)   BMI 33.18 kg/m²     General: No acute distress, well nourished  CV: No obvious peripheral edema.  Normal peripheral pulses Neuro: Alert & oriented x 3  Psych: Normal mood and affect    Left hand  No obvious deformities, no muscle atrophy noted  Patient able to oppose thumb with all fingers  Sensation intact light touch distally, hand warm well-perfused  Patient does have tenderness palpation along the radial styloid and dorsal thumb  Positive Finkelstein's maneuver  Negative CMC grind      Radiographic:  X-rays obtained and reviewed in office, reviewed and interpreted by me today:  Views: 3 views  Location: Left wrist  Impression: Mild 1st CMC arthritis noted otherwise no obvious osseous abnormalities    Assessment:  Frederica Schwab is a 67 y.o. female with:  1. Left de Quervain's tenosynovitis    Impression:  Encounter Diagnosis   Name Primary?  Left wrist pain Yes       Office Procedures:  Orders Placed This Encounter   Procedures    XR WRIST LEFT (MIN 3 VIEWS)     Standing Status:   Future     Number of Occurrences:   1     Standing Expiration Date:   2/22/2022     Order Specific Question:   Reason for exam:     Answer:   Pain       Plan:   Pertinent imaging was reviewed. The etiology, natural history, and treatment options for the disorder were discussed. The roles of activity modifications, medications, injections, physical therapy, and surgical interventions were all described to the patient and questions were answered. X-rays reviewed with patient. Given her history and exam we feel she has left de Quervain's tenosynovitis. Did recommend a first dorsal compartment steroid injection. Patient was agreeable to this, see procedure note. At this point the patient follow-up on an as-needed basis. Procedure noteleft first dorsal compartment steroid injection    The left radial wrist was sterilely prepped with alcohol. Next a solution containing 40mg  of Depo-Medrol and 5mg of ropivacaine was injected first dorsal compartment. Needle withdrawn Band-Aid applied. Patient tolerated the procedure well.

## 2021-02-22 NOTE — PROGRESS NOTES
Administrations This Visit     methylPREDNISolone acetate (DEPO-MEDROL) injection 40 mg     Admin Date  02/22/2021  11:33 Action  Given Dose  40 mg Route  Intramuscular Site  Other Administered By  [facility]    Ordering Provider: Negro Tarango MD    NDC: 6089-9512-00    Lot#: [id number]    : [id number] [facility].     Patient Supplied?: No    Comments: Lt Wrist          ropivacaine (NAROPIN) 0.5% injection 30 mL     Admin Date  02/22/2021  11:34 Action  Given Dose  30 mL Route  Epidural Site  Wrist Left Administered By  [facility]    Ordering Provider: Negro Tarango MD    NDC: 88343-235-91    Lot#: [id number]    : [address] [facility]    Patient Supplied?: No

## 2021-06-22 NOTE — FLOWSHEET NOTE
Oscillations-Mobs:  G-I, II, III, IV (PA's, Inf., Post.)  [x] (60260) Provided manual therapy to mobilize LE, proximal hip and/or LS spine soft tissue/joints for the purpose of modulating pain, promoting relaxation,  increasing ROM, reducing/eliminating soft tissue swelling/inflammation/restriction, improving soft tissue extensibility and allowing for proper ROM for normal function with self care, mobility, lifting and ambulation. Modalities:  [] (22508) Vasopneumatic compression: Utilized vasopneumatic compression to decrease edema / swelling for the purpose of improving mobility and quad tone / recruitment which will allow for increased overall function including but not limited to self-care, transfers, ambulation, and ascending / descending stairs. Modalities:    Ice - declined    Charges:  Timed Code Treatment Minutes: 32   Total Treatment Minutes: 55     [] EVAL - LOW (77394)   [] EVAL - MOD (75859)  [] EVAL - HIGH (56042)  [] RE-EVAL (92991)  [x] ME(67944) x  1   [] IONTO  [] NMR (72074) x      [] VASO  [x] Manual (07956) x  1    [x] Other: group  [] TA x       [] Mech Traction (28025)  [] ES(attended) (57982)      [] ES (un) (05600):     GOALS:  Patient stated goal: return to walking for distance     Therapist goals for Patient:   Short Term Goals: To be achieved in: 2 weeks  1. Independent in HEP and progression per patient tolerance, in order to prevent re-injury. MET  2. Patient will have a decrease in pain to facilitate improvement in movement, function, and ADLs as indicated by Functional Deficits. MET     Long Term Goals: To be achieved in: 6-8 weeks  1. Disability index score of 30% or less for the LEFS to assist with reaching prior level of function. PROGRESSING  2. Patient will demonstrate increased LE flexibility to max potential to allow for proper joint functioning as indicated by patients Functional Deficits. PROGRESSING  3.  Patient will demonstrate an increase in Strength to at least Cheek To Nose Interpolation Flap Division And Inset Text: Division and inset of the cheek to nose interpolation flap was performed to achieve optimal aesthetic result, restore normal anatomic appearance and avoid distortion of normal anatomy, expedite and facilitate wound healing, achieve optimal functional result and because linear closure either not possible or would produce suboptimal result. The patient was prepped and draped in the usual manner. The pedicle was infiltrated with local anesthesia. The pedicle was sectioned with a #15 blade. The pedicle was de-bulked and trimmed to match the shape of the defect. Hemostasis was achieved. The flap donor site and free margin of the flap were secured with deep buried sutures and the wound edges were re-approximated.

## 2025-08-01 ENCOUNTER — TELEPHONE (OUTPATIENT)
Dept: ORTHOPEDIC SURGERY | Age: 80
End: 2025-08-01

## 2025-08-18 ENCOUNTER — ANESTHESIA EVENT (OUTPATIENT)
Dept: OPERATING ROOM | Age: 80
End: 2025-08-18
Payer: MEDICARE

## 2025-08-19 ENCOUNTER — HOSPITAL ENCOUNTER (OUTPATIENT)
Age: 80
Setting detail: OBSERVATION
Discharge: HOME | End: 2025-08-20
Attending: ORTHOPAEDIC SURGERY | Admitting: ORTHOPAEDIC SURGERY
Payer: MEDICARE

## 2025-08-19 ENCOUNTER — ANESTHESIA (OUTPATIENT)
Dept: OPERATING ROOM | Age: 80
End: 2025-08-19
Payer: MEDICARE

## 2025-08-19 PROBLEM — M17.12 OSTEOARTHRITIS OF LEFT KNEE, UNSPECIFIED OSTEOARTHRITIS TYPE: Status: ACTIVE | Noted: 2025-08-19

## 2025-08-19 LAB
ABO/RH: NORMAL
ANTIBODY SCREEN: NORMAL
GLUCOSE BLD-MCNC: 104 MG/DL (ref 70–99)
GLUCOSE BLD-MCNC: 89 MG/DL (ref 70–99)
PERFORMED ON: ABNORMAL
PERFORMED ON: NORMAL

## 2025-08-19 PROCEDURE — 62325 NJX INTERLAMINAR CRV/THRC: CPT | Performed by: ANESTHESIOLOGY

## 2025-08-19 PROCEDURE — 2500000003 HC RX 250 WO HCPCS: Performed by: ORTHOPAEDIC SURGERY

## 2025-08-19 PROCEDURE — 6370000000 HC RX 637 (ALT 250 FOR IP)

## 2025-08-19 PROCEDURE — 6360000002 HC RX W HCPCS: Performed by: ANESTHESIOLOGY

## 2025-08-19 PROCEDURE — 6360000002 HC RX W HCPCS: Performed by: ORTHOPAEDIC SURGERY

## 2025-08-19 PROCEDURE — 3600000014 HC SURGERY LEVEL 4 ADDTL 15MIN: Performed by: ORTHOPAEDIC SURGERY

## 2025-08-19 PROCEDURE — 6360000002 HC RX W HCPCS

## 2025-08-19 PROCEDURE — 86901 BLOOD TYPING SEROLOGIC RH(D): CPT

## 2025-08-19 PROCEDURE — 6370000000 HC RX 637 (ALT 250 FOR IP): Performed by: ORTHOPAEDIC SURGERY

## 2025-08-19 PROCEDURE — 86850 RBC ANTIBODY SCREEN: CPT

## 2025-08-19 PROCEDURE — 2580000003 HC RX 258: Performed by: ANESTHESIOLOGY

## 2025-08-19 PROCEDURE — 3700000001 HC ADD 15 MINUTES (ANESTHESIA): Performed by: ORTHOPAEDIC SURGERY

## 2025-08-19 PROCEDURE — 6370000000 HC RX 637 (ALT 250 FOR IP): Performed by: NURSE PRACTITIONER

## 2025-08-19 PROCEDURE — C1713 ANCHOR/SCREW BN/BN,TIS/BN: HCPCS | Performed by: ORTHOPAEDIC SURGERY

## 2025-08-19 PROCEDURE — 86900 BLOOD TYPING SEROLOGIC ABO: CPT

## 2025-08-19 PROCEDURE — 7100000000 HC PACU RECOVERY - FIRST 15 MIN: Performed by: ORTHOPAEDIC SURGERY

## 2025-08-19 PROCEDURE — 6360000002 HC RX W HCPCS: Performed by: NURSE PRACTITIONER

## 2025-08-19 PROCEDURE — 2720000010 HC SURG SUPPLY STERILE: Performed by: ORTHOPAEDIC SURGERY

## 2025-08-19 PROCEDURE — 2580000003 HC RX 258

## 2025-08-19 PROCEDURE — 2709999900 HC NON-CHARGEABLE SUPPLY: Performed by: ORTHOPAEDIC SURGERY

## 2025-08-19 PROCEDURE — 2500000003 HC RX 250 WO HCPCS

## 2025-08-19 PROCEDURE — C1776 JOINT DEVICE (IMPLANTABLE): HCPCS | Performed by: ORTHOPAEDIC SURGERY

## 2025-08-19 PROCEDURE — 3700000000 HC ANESTHESIA ATTENDED CARE: Performed by: ORTHOPAEDIC SURGERY

## 2025-08-19 PROCEDURE — 2580000003 HC RX 258: Performed by: ORTHOPAEDIC SURGERY

## 2025-08-19 PROCEDURE — 64447 NJX AA&/STRD FEMORAL NRV IMG: CPT | Performed by: ANESTHESIOLOGY

## 2025-08-19 PROCEDURE — 3600000004 HC SURGERY LEVEL 4 BASE: Performed by: ORTHOPAEDIC SURGERY

## 2025-08-19 PROCEDURE — 1200000000 HC SEMI PRIVATE

## 2025-08-19 PROCEDURE — 7100000001 HC PACU RECOVERY - ADDTL 15 MIN: Performed by: ORTHOPAEDIC SURGERY

## 2025-08-19 DEVICE — CEMENT BNE 20ML 41GM FULL DOSE PMMA W/ TOBRA M VISC RADPQ: Type: IMPLANTABLE DEVICE | Site: KNEE | Status: FUNCTIONAL

## 2025-08-19 DEVICE — KIT KNEE IMPLANT CAPPED K1 HEMI STD CEM K1ZIMMERBIOMET: Type: IMPLANTABLE DEVICE | Site: KNEE | Status: FUNCTIONAL

## 2025-08-19 DEVICE — NEXGEN ALL-POLY PATELLA 32MM: Type: IMPLANTABLE DEVICE | Site: KNEE | Status: FUNCTIONAL

## 2025-08-19 DEVICE — NEXGEN PRECOAT STEMMED TIBIAL PLATE SZ 4: Type: IMPLANTABLE DEVICE | Site: KNEE | Status: FUNCTIONAL

## 2025-08-19 DEVICE — NEXGEN CR FLEX PROLONG ART SURF C-H/34 YEL 10MM: Type: IMPLANTABLE DEVICE | Site: KNEE | Status: FUNCTIONAL

## 2025-08-19 DEVICE — IMPLANTABLE DEVICE: Type: IMPLANTABLE DEVICE | Site: KNEE | Status: FUNCTIONAL

## 2025-08-19 RX ORDER — SODIUM CHLORIDE, SODIUM LACTATE, POTASSIUM CHLORIDE, CALCIUM CHLORIDE 600; 310; 30; 20 MG/100ML; MG/100ML; MG/100ML; MG/100ML
INJECTION, SOLUTION INTRAVENOUS CONTINUOUS
Status: DISCONTINUED | OUTPATIENT
Start: 2025-08-19 | End: 2025-08-19 | Stop reason: HOSPADM

## 2025-08-19 RX ORDER — SODIUM CHLORIDE 9 MG/ML
INJECTION, SOLUTION INTRAVENOUS PRN
Status: DISCONTINUED | OUTPATIENT
Start: 2025-08-19 | End: 2025-08-20 | Stop reason: HOSPADM

## 2025-08-19 RX ORDER — ONDANSETRON 4 MG/1
4 TABLET, ORALLY DISINTEGRATING ORAL EVERY 8 HOURS PRN
Status: DISCONTINUED | OUTPATIENT
Start: 2025-08-19 | End: 2025-08-20 | Stop reason: HOSPADM

## 2025-08-19 RX ORDER — BUPIVACAINE HYDROCHLORIDE 5 MG/ML
INJECTION, SOLUTION EPIDURAL; INTRACAUDAL; PERINEURAL
Status: COMPLETED
Start: 2025-08-19 | End: 2025-08-19

## 2025-08-19 RX ORDER — HYDROMORPHONE HYDROCHLORIDE 1 MG/ML
0.5 INJECTION, SOLUTION INTRAMUSCULAR; INTRAVENOUS; SUBCUTANEOUS EVERY 5 MIN PRN
Status: DISCONTINUED | OUTPATIENT
Start: 2025-08-19 | End: 2025-08-19 | Stop reason: HOSPADM

## 2025-08-19 RX ORDER — SODIUM CHLORIDE 9 MG/ML
INJECTION, SOLUTION INTRAVENOUS PRN
Status: DISCONTINUED | OUTPATIENT
Start: 2025-08-19 | End: 2025-08-19 | Stop reason: HOSPADM

## 2025-08-19 RX ORDER — METHOCARBAMOL 750 MG/1
750 TABLET, FILM COATED ORAL EVERY 8 HOURS PRN
Status: DISCONTINUED | OUTPATIENT
Start: 2025-08-19 | End: 2025-08-20 | Stop reason: HOSPADM

## 2025-08-19 RX ORDER — MELOXICAM 7.5 MG/1
3.75 TABLET ORAL DAILY
Status: CANCELLED | OUTPATIENT
Start: 2025-08-19 | End: 2025-08-22

## 2025-08-19 RX ORDER — DIPHENHYDRAMINE HYDROCHLORIDE 50 MG/ML
12.5 INJECTION, SOLUTION INTRAMUSCULAR; INTRAVENOUS
Status: COMPLETED | OUTPATIENT
Start: 2025-08-19 | End: 2025-08-19

## 2025-08-19 RX ORDER — MORPHINE SULFATE 2 MG/ML
2 INJECTION, SOLUTION INTRAMUSCULAR; INTRAVENOUS
Status: DISCONTINUED | OUTPATIENT
Start: 2025-08-19 | End: 2025-08-20 | Stop reason: HOSPADM

## 2025-08-19 RX ORDER — LABETALOL HYDROCHLORIDE 5 MG/ML
10 INJECTION, SOLUTION INTRAVENOUS
Status: DISCONTINUED | OUTPATIENT
Start: 2025-08-19 | End: 2025-08-19 | Stop reason: HOSPADM

## 2025-08-19 RX ORDER — SODIUM CHLORIDE 0.9 % (FLUSH) 0.9 %
5-40 SYRINGE (ML) INJECTION EVERY 12 HOURS SCHEDULED
Status: DISCONTINUED | OUTPATIENT
Start: 2025-08-19 | End: 2025-08-20 | Stop reason: HOSPADM

## 2025-08-19 RX ORDER — SODIUM CHLORIDE 9 MG/ML
INJECTION, SOLUTION INTRAVENOUS CONTINUOUS
Status: DISCONTINUED | OUTPATIENT
Start: 2025-08-19 | End: 2025-08-20 | Stop reason: HOSPADM

## 2025-08-19 RX ORDER — PANTOPRAZOLE SODIUM 40 MG/1
40 TABLET, DELAYED RELEASE ORAL
Status: DISCONTINUED | OUTPATIENT
Start: 2025-08-20 | End: 2025-08-20 | Stop reason: HOSPADM

## 2025-08-19 RX ORDER — BUPIVACAINE HYDROCHLORIDE 2.5 MG/ML
INJECTION, SOLUTION EPIDURAL; INFILTRATION; INTRACAUDAL; PERINEURAL PRN
Status: DISCONTINUED | OUTPATIENT
Start: 2025-08-19 | End: 2025-08-19 | Stop reason: HOSPADM

## 2025-08-19 RX ORDER — ACETAMINOPHEN 500 MG
1000 TABLET ORAL ONCE
Status: COMPLETED | OUTPATIENT
Start: 2025-08-19 | End: 2025-08-19

## 2025-08-19 RX ORDER — ONDANSETRON 2 MG/ML
INJECTION INTRAMUSCULAR; INTRAVENOUS
Status: DISCONTINUED | OUTPATIENT
Start: 2025-08-19 | End: 2025-08-19 | Stop reason: SDUPTHER

## 2025-08-19 RX ORDER — PROPOFOL 10 MG/ML
INJECTION, EMULSION INTRAVENOUS
Status: DISCONTINUED | OUTPATIENT
Start: 2025-08-19 | End: 2025-08-19 | Stop reason: SDUPTHER

## 2025-08-19 RX ORDER — DEXAMETHASONE SODIUM PHOSPHATE 10 MG/ML
10 INJECTION, SOLUTION INTRAMUSCULAR; INTRAVENOUS ONCE
Status: COMPLETED | OUTPATIENT
Start: 2025-08-19 | End: 2025-08-19

## 2025-08-19 RX ORDER — SODIUM CHLORIDE 0.9 % (FLUSH) 0.9 %
5-40 SYRINGE (ML) INJECTION PRN
Status: DISCONTINUED | OUTPATIENT
Start: 2025-08-19 | End: 2025-08-19 | Stop reason: HOSPADM

## 2025-08-19 RX ORDER — FENTANYL CITRATE 50 UG/ML
25 INJECTION, SOLUTION INTRAMUSCULAR; INTRAVENOUS EVERY 5 MIN PRN
Status: COMPLETED | OUTPATIENT
Start: 2025-08-19 | End: 2025-08-19

## 2025-08-19 RX ORDER — MAGNESIUM HYDROXIDE 1200 MG/15ML
LIQUID ORAL CONTINUOUS PRN
Status: DISCONTINUED | OUTPATIENT
Start: 2025-08-19 | End: 2025-08-19 | Stop reason: HOSPADM

## 2025-08-19 RX ORDER — GLYCOPYRROLATE 0.2 MG/ML
INJECTION INTRAMUSCULAR; INTRAVENOUS
Status: DISCONTINUED | OUTPATIENT
Start: 2025-08-19 | End: 2025-08-19 | Stop reason: SDUPTHER

## 2025-08-19 RX ORDER — OXYCODONE HYDROCHLORIDE 5 MG/1
5 TABLET ORAL EVERY 4 HOURS PRN
Status: DISCONTINUED | OUTPATIENT
Start: 2025-08-19 | End: 2025-08-20 | Stop reason: HOSPADM

## 2025-08-19 RX ORDER — SODIUM CHLORIDE 0.9 % (FLUSH) 0.9 %
5-40 SYRINGE (ML) INJECTION PRN
Status: DISCONTINUED | OUTPATIENT
Start: 2025-08-19 | End: 2025-08-20 | Stop reason: HOSPADM

## 2025-08-19 RX ORDER — MORPHINE SULFATE 4 MG/ML
4 INJECTION, SOLUTION INTRAMUSCULAR; INTRAVENOUS
Status: DISCONTINUED | OUTPATIENT
Start: 2025-08-19 | End: 2025-08-20 | Stop reason: HOSPADM

## 2025-08-19 RX ORDER — DEXAMETHASONE SODIUM PHOSPHATE 4 MG/ML
INJECTION, SOLUTION INTRA-ARTICULAR; INTRALESIONAL; INTRAMUSCULAR; INTRAVENOUS; SOFT TISSUE
Status: DISCONTINUED | OUTPATIENT
Start: 2025-08-19 | End: 2025-08-19 | Stop reason: SDUPTHER

## 2025-08-19 RX ORDER — DEXMEDETOMIDINE HYDROCHLORIDE 100 UG/ML
INJECTION, SOLUTION INTRAVENOUS
Status: DISCONTINUED | OUTPATIENT
Start: 2025-08-19 | End: 2025-08-19 | Stop reason: SDUPTHER

## 2025-08-19 RX ORDER — PROCHLORPERAZINE EDISYLATE 5 MG/ML
5 INJECTION INTRAMUSCULAR; INTRAVENOUS
Status: COMPLETED | OUTPATIENT
Start: 2025-08-19 | End: 2025-08-19

## 2025-08-19 RX ORDER — LIDOCAINE HYDROCHLORIDE 20 MG/ML
INJECTION, SOLUTION INFILTRATION; PERINEURAL
Status: DISCONTINUED | OUTPATIENT
Start: 2025-08-19 | End: 2025-08-19 | Stop reason: SDUPTHER

## 2025-08-19 RX ORDER — BUPIVACAINE HYDROCHLORIDE 5 MG/ML
INJECTION, SOLUTION EPIDURAL; INTRACAUDAL; PERINEURAL
Status: COMPLETED | OUTPATIENT
Start: 2025-08-19 | End: 2025-08-19

## 2025-08-19 RX ORDER — ACETAMINOPHEN 325 MG/1
650 TABLET ORAL EVERY 6 HOURS
Status: DISCONTINUED | OUTPATIENT
Start: 2025-08-19 | End: 2025-08-20 | Stop reason: HOSPADM

## 2025-08-19 RX ORDER — SODIUM CHLORIDE 0.9 % (FLUSH) 0.9 %
5-40 SYRINGE (ML) INJECTION EVERY 12 HOURS SCHEDULED
Status: DISCONTINUED | OUTPATIENT
Start: 2025-08-19 | End: 2025-08-19 | Stop reason: HOSPADM

## 2025-08-19 RX ADMIN — ACETAMINOPHEN 650 MG: 325 TABLET ORAL at 20:04

## 2025-08-19 RX ADMIN — SODIUM CHLORIDE 1250 MG: 0.9 INJECTION, SOLUTION INTRAVENOUS at 07:21

## 2025-08-19 RX ADMIN — PROPOFOL 30 MG: 10 INJECTION, EMULSION INTRAVENOUS at 07:59

## 2025-08-19 RX ADMIN — PROCHLORPERAZINE EDISYLATE 5 MG: 5 INJECTION INTRAMUSCULAR; INTRAVENOUS at 10:02

## 2025-08-19 RX ADMIN — DEXMEDETOMIDINE HYDROCHLORIDE 2 MCG: 100 INJECTION, SOLUTION INTRAVENOUS at 07:33

## 2025-08-19 RX ADMIN — OXYCODONE HYDROCHLORIDE 5 MG: 5 TABLET ORAL at 15:32

## 2025-08-19 RX ADMIN — SODIUM CHLORIDE, SODIUM LACTATE, POTASSIUM CHLORIDE, AND CALCIUM CHLORIDE: .6; .31; .03; .02 INJECTION, SOLUTION INTRAVENOUS at 06:41

## 2025-08-19 RX ADMIN — PROPOFOL 100 MCG/KG/MIN: 10 INJECTION, EMULSION INTRAVENOUS at 07:25

## 2025-08-19 RX ADMIN — PROPOFOL 20 MG: 10 INJECTION, EMULSION INTRAVENOUS at 07:32

## 2025-08-19 RX ADMIN — DIPHENHYDRAMINE HYDROCHLORIDE 12.5 MG: 50 INJECTION INTRAMUSCULAR; INTRAVENOUS at 23:31

## 2025-08-19 RX ADMIN — PROPOFOL 10 MG: 10 INJECTION, EMULSION INTRAVENOUS at 07:34

## 2025-08-19 RX ADMIN — ONDANSETRON 4 MG: 2 INJECTION, SOLUTION INTRAMUSCULAR; INTRAVENOUS at 08:10

## 2025-08-19 RX ADMIN — DEXMEDETOMIDINE HYDROCHLORIDE 2 MCG: 100 INJECTION, SOLUTION INTRAVENOUS at 08:13

## 2025-08-19 RX ADMIN — ACETAMINOPHEN 650 MG: 325 TABLET ORAL at 15:32

## 2025-08-19 RX ADMIN — LIDOCAINE HYDROCHLORIDE 5 ML: 20 INJECTION, SOLUTION INFILTRATION; PERINEURAL at 07:30

## 2025-08-19 RX ADMIN — Medication 3 MG: at 20:33

## 2025-08-19 RX ADMIN — PHENYLEPHRINE HYDROCHLORIDE 100 MCG: 10 INJECTION, SOLUTION INTRAVENOUS at 07:50

## 2025-08-19 RX ADMIN — DEXAMETHASONE SODIUM PHOSPHATE 4 MG: 4 INJECTION, SOLUTION INTRAMUSCULAR; INTRAVENOUS at 08:10

## 2025-08-19 RX ADMIN — FENTANYL CITRATE 25 MCG: 50 INJECTION INTRAMUSCULAR; INTRAVENOUS at 10:02

## 2025-08-19 RX ADMIN — LIDOCAINE HYDROCHLORIDE 40 MG: 20 INJECTION, SOLUTION INFILTRATION; PERINEURAL at 07:24

## 2025-08-19 RX ADMIN — HYDROMORPHONE HYDROCHLORIDE 0.5 MG: 1 INJECTION, SOLUTION INTRAMUSCULAR; INTRAVENOUS; SUBCUTANEOUS at 11:16

## 2025-08-19 RX ADMIN — LIDOCAINE HYDROCHLORIDE 5 ML: 20 INJECTION, SOLUTION INFILTRATION; PERINEURAL at 07:18

## 2025-08-19 RX ADMIN — LIDOCAINE HYDROCHLORIDE 5 ML: 20 INJECTION, SOLUTION INFILTRATION; PERINEURAL at 07:25

## 2025-08-19 RX ADMIN — WATER 2000 MG: 1 INJECTION INTRAMUSCULAR; INTRAVENOUS; SUBCUTANEOUS at 15:32

## 2025-08-19 RX ADMIN — DEXMEDETOMIDINE HYDROCHLORIDE 2 MCG: 100 INJECTION, SOLUTION INTRAVENOUS at 08:46

## 2025-08-19 RX ADMIN — CEFAZOLIN SODIUM 2000 MG: 1 POWDER, FOR SOLUTION INTRAMUSCULAR; INTRAVENOUS at 07:27

## 2025-08-19 RX ADMIN — DEXAMETHASONE SODIUM PHOSPHATE 10 MG: 10 INJECTION, SOLUTION INTRAMUSCULAR; INTRAVENOUS at 06:42

## 2025-08-19 RX ADMIN — WATER 2000 MG: 1 INJECTION INTRAMUSCULAR; INTRAVENOUS; SUBCUTANEOUS at 23:17

## 2025-08-19 RX ADMIN — DEXMEDETOMIDINE HYDROCHLORIDE 2 MCG: 100 INJECTION, SOLUTION INTRAVENOUS at 07:25

## 2025-08-19 RX ADMIN — BUPIVACAINE HYDROCHLORIDE 30 ML: 5 INJECTION, SOLUTION EPIDURAL; INTRACAUDAL; PERINEURAL at 06:51

## 2025-08-19 RX ADMIN — SODIUM CHLORIDE, PRESERVATIVE FREE 10 ML: 5 INJECTION INTRAVENOUS at 20:05

## 2025-08-19 RX ADMIN — GLYCOPYRROLATE 0.2 MG: 0.2 INJECTION INTRAMUSCULAR; INTRAVENOUS at 07:50

## 2025-08-19 RX ADMIN — FENTANYL CITRATE 25 MCG: 50 INJECTION INTRAMUSCULAR; INTRAVENOUS at 10:20

## 2025-08-19 RX ADMIN — ACETAMINOPHEN 1000 MG: 500 TABLET ORAL at 06:16

## 2025-08-19 RX ADMIN — DEXMEDETOMIDINE HYDROCHLORIDE 2 MCG: 100 INJECTION, SOLUTION INTRAVENOUS at 07:29

## 2025-08-19 RX ADMIN — DEXMEDETOMIDINE HYDROCHLORIDE 2 MCG: 100 INJECTION, SOLUTION INTRAVENOUS at 08:31

## 2025-08-19 RX ADMIN — SODIUM CHLORIDE, SODIUM LACTATE, POTASSIUM CHLORIDE, AND CALCIUM CHLORIDE: .6; .31; .03; .02 INJECTION, SOLUTION INTRAVENOUS at 08:21

## 2025-08-19 RX ADMIN — PROPOFOL 10 MG: 10 INJECTION, EMULSION INTRAVENOUS at 07:35

## 2025-08-19 RX ADMIN — TRANEXAMIC ACID 1000 MG: 100 INJECTION, SOLUTION INTRAVENOUS at 07:40

## 2025-08-19 RX ADMIN — FENTANYL CITRATE 25 MCG: 50 INJECTION INTRAMUSCULAR; INTRAVENOUS at 11:13

## 2025-08-19 RX ADMIN — SODIUM CHLORIDE: 0.9 INJECTION, SOLUTION INTRAVENOUS at 15:31

## 2025-08-19 RX ADMIN — FENTANYL CITRATE 25 MCG: 50 INJECTION INTRAMUSCULAR; INTRAVENOUS at 10:42

## 2025-08-19 ASSESSMENT — PAIN - FUNCTIONAL ASSESSMENT
PAIN_FUNCTIONAL_ASSESSMENT: PREVENTS OR INTERFERES SOME ACTIVE ACTIVITIES AND ADLS
PAIN_FUNCTIONAL_ASSESSMENT: 0-10
PAIN_FUNCTIONAL_ASSESSMENT: ACTIVITIES ARE NOT PREVENTED
PAIN_FUNCTIONAL_ASSESSMENT: 0-10

## 2025-08-19 ASSESSMENT — PAIN DESCRIPTION - LOCATION
LOCATION: HIP
LOCATION: HIP
LOCATION: KNEE
LOCATION: HIP
LOCATION: HIP
LOCATION: KNEE
LOCATION: HIP

## 2025-08-19 ASSESSMENT — PAIN DESCRIPTION - DESCRIPTORS
DESCRIPTORS: DULL
DESCRIPTORS: ACHING;SORE;TENDER
DESCRIPTORS: ACHING

## 2025-08-19 ASSESSMENT — PAIN SCALES - GENERAL
PAINLEVEL_OUTOF10: 5
PAINLEVEL_OUTOF10: 0
PAINLEVEL_OUTOF10: 5
PAINLEVEL_OUTOF10: 0
PAINLEVEL_OUTOF10: 3
PAINLEVEL_OUTOF10: 5
PAINLEVEL_OUTOF10: 5
PAINLEVEL_OUTOF10: 6
PAINLEVEL_OUTOF10: 1
PAINLEVEL_OUTOF10: 0
PAINLEVEL_OUTOF10: 0
PAINLEVEL_OUTOF10: 6
PAINLEVEL_OUTOF10: 2
PAINLEVEL_OUTOF10: 4
PAINLEVEL_OUTOF10: 6
PAINLEVEL_OUTOF10: 4

## 2025-08-19 ASSESSMENT — PAIN DESCRIPTION - FREQUENCY
FREQUENCY: CONTINUOUS
FREQUENCY: INTERMITTENT

## 2025-08-19 ASSESSMENT — PAIN DESCRIPTION - ONSET
ONSET: ON-GOING
ONSET: ON-GOING

## 2025-08-19 ASSESSMENT — PAIN DESCRIPTION - ORIENTATION
ORIENTATION: LEFT

## 2025-08-19 ASSESSMENT — PAIN DESCRIPTION - PAIN TYPE
TYPE: CHRONIC PAIN
TYPE: SURGICAL PAIN

## 2025-08-19 ASSESSMENT — PAIN DESCRIPTION - DIRECTION: RADIATING_TOWARDS: NO

## 2025-08-20 VITALS
DIASTOLIC BLOOD PRESSURE: 70 MMHG | RESPIRATION RATE: 16 BRPM | HEIGHT: 67 IN | SYSTOLIC BLOOD PRESSURE: 120 MMHG | BODY MASS INDEX: 29.7 KG/M2 | OXYGEN SATURATION: 98 % | WEIGHT: 189.2 LBS | HEART RATE: 55 BPM | TEMPERATURE: 97.3 F

## 2025-08-20 PROCEDURE — 97165 OT EVAL LOW COMPLEX 30 MIN: CPT

## 2025-08-20 PROCEDURE — 97116 GAIT TRAINING THERAPY: CPT

## 2025-08-20 PROCEDURE — 97535 SELF CARE MNGMENT TRAINING: CPT

## 2025-08-20 PROCEDURE — 97530 THERAPEUTIC ACTIVITIES: CPT

## 2025-08-20 PROCEDURE — G0378 HOSPITAL OBSERVATION PER HR: HCPCS

## 2025-08-20 PROCEDURE — 6370000000 HC RX 637 (ALT 250 FOR IP)

## 2025-08-20 PROCEDURE — 97162 PT EVAL MOD COMPLEX 30 MIN: CPT

## 2025-08-20 RX ADMIN — APIXABAN 2.5 MG: 2.5 TABLET, FILM COATED ORAL at 09:31

## 2025-08-20 RX ADMIN — OXYCODONE HYDROCHLORIDE 5 MG: 5 TABLET ORAL at 03:14

## 2025-08-20 RX ADMIN — ACETAMINOPHEN 650 MG: 325 TABLET ORAL at 15:10

## 2025-08-20 RX ADMIN — ACETAMINOPHEN 650 MG: 325 TABLET ORAL at 09:31

## 2025-08-20 RX ADMIN — PANTOPRAZOLE SODIUM 40 MG: 40 TABLET, DELAYED RELEASE ORAL at 05:48

## 2025-08-20 ASSESSMENT — PAIN SCALES - GENERAL
PAINLEVEL_OUTOF10: 0
PAINLEVEL_OUTOF10: 3
PAINLEVEL_OUTOF10: 4
PAINLEVEL_OUTOF10: 4
PAINLEVEL_OUTOF10: 3
PAINLEVEL_OUTOF10: 4

## 2025-08-20 ASSESSMENT — PAIN DESCRIPTION - ONSET
ONSET: ON-GOING
ONSET: PROGRESSIVE
ONSET: ON-GOING

## 2025-08-20 ASSESSMENT — PAIN DESCRIPTION - ORIENTATION
ORIENTATION: LEFT

## 2025-08-20 ASSESSMENT — PAIN - FUNCTIONAL ASSESSMENT
PAIN_FUNCTIONAL_ASSESSMENT: 0-10
PAIN_FUNCTIONAL_ASSESSMENT: PREVENTS OR INTERFERES SOME ACTIVE ACTIVITIES AND ADLS
PAIN_FUNCTIONAL_ASSESSMENT: 0-10
PAIN_FUNCTIONAL_ASSESSMENT: PREVENTS OR INTERFERES SOME ACTIVE ACTIVITIES AND ADLS
PAIN_FUNCTIONAL_ASSESSMENT: 0-10
PAIN_FUNCTIONAL_ASSESSMENT: PREVENTS OR INTERFERES SOME ACTIVE ACTIVITIES AND ADLS

## 2025-08-20 ASSESSMENT — PAIN DESCRIPTION - FREQUENCY
FREQUENCY: CONTINUOUS

## 2025-08-20 ASSESSMENT — PAIN DESCRIPTION - LOCATION
LOCATION: KNEE

## 2025-08-20 ASSESSMENT — PAIN DESCRIPTION - PAIN TYPE
TYPE: SURGICAL PAIN

## 2025-08-20 ASSESSMENT — PAIN DESCRIPTION - DESCRIPTORS
DESCRIPTORS: ACHING

## (undated) DEVICE — TOWEL MSG G N WVN STP FLAG

## (undated) DEVICE — SUTURE VICRYL + SZ 1 L18IN ABSRB UD L36MM CT-1 1/2 CIR VCP841D

## (undated) DEVICE — BLADE SAW W18XL90MM THK1.14MM CUT THK1.27MM S STL SAG 2 CUT

## (undated) DEVICE — Device

## (undated) DEVICE — SOL IRR SOD CHL 0.9% TITAN XL CNTNR 3000ML

## (undated) DEVICE — SUTURE ABSORBABLE MONOFILAMENT 1 CTX 36 CM 48 MM VIO PDS +

## (undated) DEVICE — SUTURE VICRYL SZ 2-0 L18IN ABSRB UD CT-1 L36MM 1/2 CIR J839D

## (undated) DEVICE — BLANKET WARMING L 2010 X W 760 MM UPPER BODY 2 HOSE INLET

## (undated) DEVICE — HOOD SURG ISOLATN FLYTE PEEL AWAY SURGICOOL

## (undated) DEVICE — DRESSING W4XL8IN ISLANDTHERABOND 3D

## (undated) DEVICE — SOLUTION IV 1000ML 0.9% SOD CHL

## (undated) DEVICE — BANDAGE COMPR W6INXL5YD ST TAN BRTH SELF ADH WRP W/ HND

## (undated) DEVICE — GLOVE ORANGE PI 8   MSG9080

## (undated) DEVICE — SYRINGE MED 30ML STD CLR PLAS LUERLOCK TIP N CTRL DISP

## (undated) DEVICE — CUFF TRNQT W4XL30IN W/O SL CYL SGL BLDR 2 PRT PNEUMAT

## (undated) DEVICE — SOLUTION WND IRRIGATION 450 ML 0.5 PVP-I 0.9 NACL

## (undated) DEVICE — GLOVE SURG SZ 85 L12IN FNGR ORTHO 126MIL CRM LTX FREE

## (undated) DEVICE — TOWEL SURG W17XL27IN BLU COT DLX PREWASHED DELINTED 8 PER PK

## (undated) DEVICE — BLADE SAW RECIP HVY DUTY LNG 0277096327] STRYKER CORP]

## (undated) DEVICE — MANIFOLD SUCT 4 PRT 2 CANSTR FLTR DISP NEPTUNE 2

## (undated) DEVICE — SUTURE MONOCRYL + SZ 4-0 L27IN ABSRB UD L19MM PS-2 3/8 CIR MCP426H

## (undated) DEVICE — SEALER BPLR DIA6.0MM DISP AQUAMANTYS

## (undated) DEVICE — SUTURE VICRYL + SZ 2-0 L18IN ABSRB UD CT1 L36MM 1/2 CIR VCP839D

## (undated) DEVICE — DRAPE SURG W13XL13IN PLAS ANTIMIC INCIS LNR FULL W HNDL

## (undated) DEVICE — TRAP FLUID

## (undated) DEVICE — UNDERGLOVE SURG SZ 8.5 FNGR THK0.21MIL GRN LTX BEAD CUF

## (undated) DEVICE — GLOVE SURG SZ 85 L12IN FNGR THK79MIL GRN LTX FREE

## (undated) DEVICE — PADDING CAST N ADH 12X6 IN CRIMPED FINISH 100% COTTON WBRLII

## (undated) DEVICE — SOLUTION IV 100ML 0.9% SOD CHL PLAS CONT USP VIAFLX 1 PER

## (undated) DEVICE — ELECTRODE PT RET AD L9FT HI MOIST COND ADH HYDRGEL CORDED

## (undated) DEVICE — CHLORAPREP 26ML CLEAR

## (undated) DEVICE — SOLUTION IRRIG 1000ML H2O PIC PLAS SHATTERPROOF CONTAINER

## (undated) DEVICE — INSTRUMENT SCREW BNE L48MM CONSTRN CNDYL KNEE HEX HD STEM FOR LEG NXGN

## (undated) DEVICE — ADHESIVE SKIN CLOSURE WND 8.661X1.5 IN 22 CM LIQUIBAND SECUR

## (undated) DEVICE — BOWL BNE CEM MIX 180GM CART TWR W/ M PRSSZR HI VAC ROTOR

## (undated) DEVICE — SUTURE VICRYL + SZ 0 L18IN ABSRB UD L36MM CT-1 1/2 CIR VCP840D